# Patient Record
Sex: MALE | Race: OTHER | HISPANIC OR LATINO | URBAN - METROPOLITAN AREA
[De-identification: names, ages, dates, MRNs, and addresses within clinical notes are randomized per-mention and may not be internally consistent; named-entity substitution may affect disease eponyms.]

---

## 2017-02-21 ENCOUNTER — EMERGENCY (EMERGENCY)
Facility: HOSPITAL | Age: 47
LOS: 1 days | Discharge: HOME | End: 2017-02-21
Attending: EMERGENCY MEDICINE
Payer: COMMERCIAL

## 2017-02-21 VITALS
WEIGHT: 240.08 LBS | HEIGHT: 69 IN | RESPIRATION RATE: 20 BRPM | DIASTOLIC BLOOD PRESSURE: 97 MMHG | SYSTOLIC BLOOD PRESSURE: 145 MMHG | TEMPERATURE: 98 F | HEART RATE: 74 BPM | OXYGEN SATURATION: 96 %

## 2017-02-21 DIAGNOSIS — H57.13 OCULAR PAIN, BILATERAL: ICD-10-CM

## 2017-02-21 DIAGNOSIS — H16.133 PHOTOKERATITIS, BILATERAL: ICD-10-CM

## 2017-02-21 PROCEDURE — 99053 MED SERV 10PM-8AM 24 HR FAC: CPT

## 2017-02-21 PROCEDURE — 99283 EMERGENCY DEPT VISIT LOW MDM: CPT

## 2017-02-21 PROCEDURE — 99283 EMERGENCY DEPT VISIT LOW MDM: CPT | Mod: 25

## 2017-02-21 RX ORDER — ERYTHROMYCIN BASE 5 MG/GRAM
1 OINTMENT (GRAM) OPHTHALMIC (EYE) ONCE
Qty: 0 | Refills: 0 | Status: COMPLETED | OUTPATIENT
Start: 2017-02-21 | End: 2017-02-21

## 2017-02-21 RX ORDER — ERYTHROMYCIN BASE 5 MG/GRAM
1 OINTMENT (GRAM) OPHTHALMIC (EYE)
Qty: 1 | Refills: 0
Start: 2017-02-21 | End: 2017-03-03

## 2017-02-21 RX ADMIN — Medication 1 APPLICATION(S): at 03:42
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
## 2017-02-21 NOTE — ED PROVIDER NOTE - PROGRESS NOTE DETAILS
tetracaine and woods lamp reveal b/l cornea mildly hazy. Instillation of fluorescein reveals superficial punctuate staining of the cornea

## 2017-06-26 ENCOUNTER — APPOINTMENT (OUTPATIENT)
Dept: FAMILY MEDICINE | Facility: CLINIC | Age: 47
End: 2017-06-26

## 2017-06-26 ENCOUNTER — NON-APPOINTMENT (OUTPATIENT)
Age: 47
End: 2017-06-26

## 2017-06-26 VITALS
BODY MASS INDEX: 38.04 KG/M2 | HEIGHT: 68 IN | WEIGHT: 251 LBS | DIASTOLIC BLOOD PRESSURE: 80 MMHG | SYSTOLIC BLOOD PRESSURE: 126 MMHG

## 2017-06-26 DIAGNOSIS — Z83.3 FAMILY HISTORY OF DIABETES MELLITUS: ICD-10-CM

## 2017-06-26 DIAGNOSIS — I51.7 CARDIOMEGALY: ICD-10-CM

## 2017-06-26 DIAGNOSIS — Z72.89 OTHER PROBLEMS RELATED TO LIFESTYLE: ICD-10-CM

## 2017-06-26 DIAGNOSIS — Z82.49 FAMILY HISTORY OF ISCHEMIC HEART DISEASE AND OTHER DISEASES OF THE CIRCULATORY SYSTEM: ICD-10-CM

## 2017-06-26 DIAGNOSIS — Z00.00 ENCOUNTER FOR GENERAL ADULT MEDICAL EXAMINATION W/OUT ABNORMAL FINDINGS: ICD-10-CM

## 2017-06-26 DIAGNOSIS — R00.2 PALPITATIONS: ICD-10-CM

## 2017-06-26 DIAGNOSIS — E55.9 VITAMIN D DEFICIENCY, UNSPECIFIED: ICD-10-CM

## 2017-06-26 DIAGNOSIS — Z83.2 FAMILY HISTORY OF DISEASES OF THE BLOOD AND BLOOD-FORMING ORGANS AND CERTAIN DISORDERS INVOLVING THE IMMUNE MECHANISM: ICD-10-CM

## 2017-06-26 LAB
BILIRUB UR QL STRIP: NORMAL
GLUCOSE UR-MCNC: NORMAL
HCG UR QL: NORMAL EU/DL
HGB UR QL STRIP.AUTO: NORMAL
KETONES UR-MCNC: NORMAL
LEUKOCYTE ESTERASE UR QL STRIP: NORMAL
NITRITE UR QL STRIP: NORMAL
PH UR STRIP: 7
PROT UR STRIP-MCNC: NORMAL
SP GR UR STRIP: 1.01

## 2017-06-28 DIAGNOSIS — F10.10 ALCOHOL ABUSE, UNCOMPLICATED: ICD-10-CM

## 2017-06-28 DIAGNOSIS — E66.9 OBESITY, UNSPECIFIED: ICD-10-CM

## 2017-07-12 ENCOUNTER — APPOINTMENT (OUTPATIENT)
Dept: FAMILY MEDICINE | Facility: CLINIC | Age: 47
End: 2017-07-12

## 2017-09-08 NOTE — ED ADULT TRIAGE NOTE - HEIGHT IN INCHES
----- Message from Miriam Guevara sent at 9/8/2017  2:11 PM CDT -----  Contact: Self/ [phone]/ [phone]   Type: Orders Request    What orders/ testing are being requested? Mammo     Is there a future appointment scheduled for the patient with PCP? No     When?    Comments: pt is calling to have a order for a Mammo. Please call and advise     Thank you      9

## 2017-09-26 ENCOUNTER — APPOINTMENT (OUTPATIENT)
Dept: FAMILY MEDICINE | Facility: CLINIC | Age: 47
End: 2017-09-26
Payer: COMMERCIAL

## 2017-09-26 VITALS
DIASTOLIC BLOOD PRESSURE: 82 MMHG | BODY MASS INDEX: 35.66 KG/M2 | HEART RATE: 74 BPM | WEIGHT: 234.5 LBS | OXYGEN SATURATION: 98 % | SYSTOLIC BLOOD PRESSURE: 126 MMHG

## 2017-09-26 DIAGNOSIS — N48.9 DISORDER OF PENIS, UNSPECIFIED: ICD-10-CM

## 2017-09-26 DIAGNOSIS — Z23 ENCOUNTER FOR IMMUNIZATION: ICD-10-CM

## 2017-09-26 DIAGNOSIS — Z86.19 PERSONAL HISTORY OF OTHER INFECTIOUS AND PARASITIC DISEASES: ICD-10-CM

## 2017-09-26 DIAGNOSIS — R39.9 UNSPECIFIED SYMPTOMS AND SIGNS INVOLVING THE GENITOURINARY SYSTEM: ICD-10-CM

## 2017-09-26 PROCEDURE — 90686 IIV4 VACC NO PRSV 0.5 ML IM: CPT

## 2017-09-26 PROCEDURE — G0008: CPT

## 2017-09-26 PROCEDURE — 99213 OFFICE O/P EST LOW 20 MIN: CPT | Mod: 25

## 2017-11-20 ENCOUNTER — APPOINTMENT (OUTPATIENT)
Dept: FAMILY MEDICINE | Facility: CLINIC | Age: 47
End: 2017-11-20
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
## 2019-11-12 ENCOUNTER — TRANSCRIPTION ENCOUNTER (OUTPATIENT)
Age: 49
End: 2019-11-12

## 2020-04-17 ENCOUNTER — EMERGENCY (EMERGENCY)
Facility: HOSPITAL | Age: 50
LOS: 0 days | Discharge: HOME | End: 2020-04-17
Attending: STUDENT IN AN ORGANIZED HEALTH CARE EDUCATION/TRAINING PROGRAM
Payer: COMMERCIAL

## 2020-04-17 VITALS — WEIGHT: 190.04 LBS

## 2020-04-17 VITALS
RESPIRATION RATE: 18 BRPM | DIASTOLIC BLOOD PRESSURE: 77 MMHG | TEMPERATURE: 98 F | OXYGEN SATURATION: 96 % | SYSTOLIC BLOOD PRESSURE: 130 MMHG | HEART RATE: 100 BPM

## 2020-04-17 DIAGNOSIS — R06.02 SHORTNESS OF BREATH: ICD-10-CM

## 2020-04-17 DIAGNOSIS — R50.9 FEVER, UNSPECIFIED: ICD-10-CM

## 2020-04-17 DIAGNOSIS — J12.89 OTHER VIRAL PNEUMONIA: ICD-10-CM

## 2020-04-17 DIAGNOSIS — R11.2 NAUSEA WITH VOMITING, UNSPECIFIED: ICD-10-CM

## 2020-04-17 DIAGNOSIS — R10.13 EPIGASTRIC PAIN: ICD-10-CM

## 2020-04-17 DIAGNOSIS — U07.1 COVID-19: ICD-10-CM

## 2020-04-17 LAB
ALBUMIN SERPL ELPH-MCNC: 3 G/DL — LOW (ref 3.3–5)
ALP SERPL-CCNC: 44 U/L — SIGNIFICANT CHANGE UP (ref 40–120)
ALT FLD-CCNC: 56 U/L — SIGNIFICANT CHANGE UP (ref 12–78)
ANION GAP SERPL CALC-SCNC: 6 MMOL/L — SIGNIFICANT CHANGE UP (ref 5–17)
APAP SERPL-MCNC: 4 UG/ML — LOW (ref 10–30)
AST SERPL-CCNC: 51 U/L — HIGH (ref 15–37)
BASOPHILS # BLD AUTO: 0.01 K/UL — SIGNIFICANT CHANGE UP (ref 0–0.2)
BASOPHILS NFR BLD AUTO: 0.1 % — SIGNIFICANT CHANGE UP (ref 0–2)
BILIRUB SERPL-MCNC: 0.4 MG/DL — SIGNIFICANT CHANGE UP (ref 0.2–1.2)
BUN SERPL-MCNC: 9 MG/DL — SIGNIFICANT CHANGE UP (ref 7–23)
CALCIUM SERPL-MCNC: 8.1 MG/DL — LOW (ref 8.5–10.1)
CHLORIDE SERPL-SCNC: 103 MMOL/L — SIGNIFICANT CHANGE UP (ref 96–108)
CO2 SERPL-SCNC: 23 MMOL/L — SIGNIFICANT CHANGE UP (ref 22–31)
CREAT SERPL-MCNC: 0.86 MG/DL — SIGNIFICANT CHANGE UP (ref 0.5–1.3)
D DIMER BLD IA.RAPID-MCNC: 264 NG/ML DDU — HIGH
EOSINOPHIL # BLD AUTO: 0 K/UL — SIGNIFICANT CHANGE UP (ref 0–0.5)
EOSINOPHIL NFR BLD AUTO: 0 % — SIGNIFICANT CHANGE UP (ref 0–6)
ETHANOL SERPL-MCNC: <10 MG/DL — SIGNIFICANT CHANGE UP (ref 0–10)
GLUCOSE SERPL-MCNC: 133 MG/DL — HIGH (ref 70–99)
HCT VFR BLD CALC: 37.6 % — LOW (ref 39–50)
HGB BLD-MCNC: 13.4 G/DL — SIGNIFICANT CHANGE UP (ref 13–17)
IMM GRANULOCYTES NFR BLD AUTO: 0.5 % — SIGNIFICANT CHANGE UP (ref 0–1.5)
LYMPHOCYTES # BLD AUTO: 1.27 K/UL — SIGNIFICANT CHANGE UP (ref 1–3.3)
LYMPHOCYTES # BLD AUTO: 9.8 % — LOW (ref 13–44)
MCHC RBC-ENTMCNC: 29.6 PG — SIGNIFICANT CHANGE UP (ref 27–34)
MCHC RBC-ENTMCNC: 35.6 GM/DL — SIGNIFICANT CHANGE UP (ref 32–36)
MCV RBC AUTO: 83.2 FL — SIGNIFICANT CHANGE UP (ref 80–100)
MONOCYTES # BLD AUTO: 0.33 K/UL — SIGNIFICANT CHANGE UP (ref 0–0.9)
MONOCYTES NFR BLD AUTO: 2.5 % — SIGNIFICANT CHANGE UP (ref 2–14)
NEUTROPHILS # BLD AUTO: 11.28 K/UL — HIGH (ref 1.8–7.4)
NEUTROPHILS NFR BLD AUTO: 87.1 % — HIGH (ref 43–77)
PLATELET # BLD AUTO: 459 K/UL — HIGH (ref 150–400)
POTASSIUM SERPL-MCNC: 4.4 MMOL/L — SIGNIFICANT CHANGE UP (ref 3.5–5.3)
POTASSIUM SERPL-SCNC: 4.4 MMOL/L — SIGNIFICANT CHANGE UP (ref 3.5–5.3)
PROT SERPL-MCNC: 7.8 GM/DL — SIGNIFICANT CHANGE UP (ref 6–8.3)
RBC # BLD: 4.52 M/UL — SIGNIFICANT CHANGE UP (ref 4.2–5.8)
RBC # FLD: 12.2 % — SIGNIFICANT CHANGE UP (ref 10.3–14.5)
SARS-COV-2 RNA SPEC QL NAA+PROBE: DETECTED
SODIUM SERPL-SCNC: 132 MMOL/L — LOW (ref 135–145)
WBC # BLD: 12.95 K/UL — HIGH (ref 3.8–10.5)
WBC # FLD AUTO: 12.95 K/UL — HIGH (ref 3.8–10.5)

## 2020-04-17 PROCEDURE — 82728 ASSAY OF FERRITIN: CPT

## 2020-04-17 PROCEDURE — 85025 COMPLETE CBC W/AUTO DIFF WBC: CPT

## 2020-04-17 PROCEDURE — G0480: CPT

## 2020-04-17 PROCEDURE — 96375 TX/PRO/DX INJ NEW DRUG ADDON: CPT

## 2020-04-17 PROCEDURE — 71045 X-RAY EXAM CHEST 1 VIEW: CPT

## 2020-04-17 PROCEDURE — 86140 C-REACTIVE PROTEIN: CPT

## 2020-04-17 PROCEDURE — 87635 SARS-COV-2 COVID-19 AMP PRB: CPT

## 2020-04-17 PROCEDURE — 96374 THER/PROPH/DIAG INJ IV PUSH: CPT

## 2020-04-17 PROCEDURE — 80307 DRUG TEST PRSMV CHEM ANLYZR: CPT

## 2020-04-17 PROCEDURE — 84145 PROCALCITONIN (PCT): CPT

## 2020-04-17 PROCEDURE — 36415 COLL VENOUS BLD VENIPUNCTURE: CPT

## 2020-04-17 PROCEDURE — 71045 X-RAY EXAM CHEST 1 VIEW: CPT | Mod: 26

## 2020-04-17 PROCEDURE — 99284 EMERGENCY DEPT VISIT MOD MDM: CPT

## 2020-04-17 PROCEDURE — 99284 EMERGENCY DEPT VISIT MOD MDM: CPT | Mod: 25

## 2020-04-17 PROCEDURE — 80053 COMPREHEN METABOLIC PANEL: CPT

## 2020-04-17 PROCEDURE — 85379 FIBRIN DEGRADATION QUANT: CPT

## 2020-04-17 RX ORDER — ONDANSETRON 8 MG/1
4 TABLET, FILM COATED ORAL ONCE
Refills: 0 | Status: COMPLETED | OUTPATIENT
Start: 2020-04-17 | End: 2020-04-17

## 2020-04-17 RX ORDER — AZITHROMYCIN 500 MG/1
1 TABLET, FILM COATED ORAL
Qty: 4 | Refills: 0
Start: 2020-04-17 | End: 2020-04-20

## 2020-04-17 RX ORDER — SODIUM CHLORIDE 9 MG/ML
500 INJECTION INTRAMUSCULAR; INTRAVENOUS; SUBCUTANEOUS ONCE
Refills: 0 | Status: COMPLETED | OUTPATIENT
Start: 2020-04-17 | End: 2020-04-17

## 2020-04-17 RX ORDER — FAMOTIDINE 10 MG/ML
20 INJECTION INTRAVENOUS ONCE
Refills: 0 | Status: COMPLETED | OUTPATIENT
Start: 2020-04-17 | End: 2020-04-17

## 2020-04-17 RX ORDER — ALBUTEROL 90 UG/1
2 AEROSOL, METERED ORAL
Qty: 1 | Refills: 0
Start: 2020-04-17

## 2020-04-17 RX ORDER — AZITHROMYCIN 500 MG/1
500 TABLET, FILM COATED ORAL ONCE
Refills: 0 | Status: COMPLETED | OUTPATIENT
Start: 2020-04-17 | End: 2020-04-17

## 2020-04-17 RX ADMIN — Medication 30 MILLILITER(S): at 20:06

## 2020-04-17 RX ADMIN — AZITHROMYCIN 500 MILLIGRAM(S): 500 TABLET, FILM COATED ORAL at 21:24

## 2020-04-17 RX ADMIN — SODIUM CHLORIDE 500 MILLILITER(S): 9 INJECTION INTRAMUSCULAR; INTRAVENOUS; SUBCUTANEOUS at 20:06

## 2020-04-17 RX ADMIN — FAMOTIDINE 20 MILLIGRAM(S): 10 INJECTION INTRAVENOUS at 20:10

## 2020-04-17 RX ADMIN — Medication 100 MILLIGRAM(S): at 20:08

## 2020-04-17 RX ADMIN — ONDANSETRON 4 MILLIGRAM(S): 8 TABLET, FILM COATED ORAL at 20:08

## 2020-04-17 NOTE — ED STATDOCS - CLINICAL SUMMARY MEDICAL DECISION MAKING FREE TEXT BOX
pt with 4 days of sx consistent with COVID given multiple + sick contacts. given significant sx and borderline O2 sat, will check labs, treat symptomatically, check ambulatory sats. if ambulatory sat stable and sx improvement, will dc home with return precautions.

## 2020-04-17 NOTE — ED STATDOCS - PATIENT PORTAL LINK FT
You can access the FollowMyHealth Patient Portal offered by [facility] by registering at the following website: [email or web address]. By joining [facility]’s FollowMyHealth portal, you will also be able to view your health information using other applications (apps) compatible with our system.

## 2020-04-17 NOTE — ED ADULT NURSE NOTE - OBJECTIVE STATEMENT
pt presents to the ED c/o 4 days of fever, cough, diarrhea, nausea, epigastric pain. sx worse at night. Daughter and coworkers are COVID+. Pt has been taking Tylenol around the clock without improvement in sx so comes to ED for further evaluation. Pt AOx4, breathing symmetrical and unlabored, #20 IV inserted into L. top of hand, bloods drawn and sent to lab, pt in no acute distress at this time, will continue to monitor.

## 2020-04-17 NOTE — ED STATDOCS - NSFOLLOWUPINSTRUCTIONS_ED_ALL_ED_FT
¿Qué es el COVID-19?  La enfermedad por coronavirus 2019, o "COVID-19", es kimmy infección ocasionada por un virus específico llamado SARS-CoV-2. El virus apareció por primera vez a fines de 2019 en la ciudad de [facility], en [place], johanna desde entonces se ha propagado velozmente y ahora hay casos en muchos otros lugares, nazia [place] y [place].    Las personas que padecen COVID-19 pueden tener fiebre, tos y dificultad para respirar. Los problemas para respirar aparecen cuando la infección afecta a los pulmones y causa neumonía (figura 1).    Los expertos están estudiando mary virus y seguirán descubriendo información a medida que pase el tiempo.    ¿Cómo se contagia el COVID-19?  El COVID-19 se contagia principalmente de kimmy persona a otra, de manera similar a la gripe. En general, esto ocurre cuando kimmy persona enferma tose o estornuda cerca de otras personas. Los médicos también creen que es posible contagiarse al tocar kimmy superficie en la que se encuentra el virus y luego tocarse la boca, la nariz o los ojos.    Según los conocimientos de los expertos hasta el momento, al parecer el COVID-19 se contagia más fácilmente cuando hay síntomas presentes. También se puede contagiar sin síntomas, johanna los expertos no saben qué finley común es esto.    ¿Cuáles son los síntomas del COVID-19?  Los síntomas suelen comenzar algunos días después de que la persona se infecta con el virus, johanna en algunos casos pueden tardar aún más en aparecer.    Estos pueden ser algunos de los síntomas:    ?Fiebre    ?Tos    ?Sensación de cansancio    ?Dificultad para respirar    ?Elham musculares    La mayoría de las personas tienen síntomas leves, mientras que algunas personas no tienen ningún síntoma. Sin embargo, en otras personas el COVID-19 puede causar problemas graves nazia neumonía, falta de oxígeno o incluso la muerte. [place] es más común en personas de edad avanzada o que tienen otros problemas de krystle.    Si phu los niños pueden contraer COVID-19, al parecer es menos probable que tengan síntomas graves.    ¿Ameena consultar a un médico o enfermero?  Si tiene fiebre, tos o dificultad para respirar, y es posible que haya estado expuesto al COVID-19, llame al médico o enfermero. Podría haberse expuesto si ocurrió cualquiera de las siguientes situaciones en los últimos 14 días:    ?Tuvo contacto directo con kimmy persona que tiene el virus – Por lo general, esto significa estar a kimmy distancia de aproximadamente 6 pies (1,8 metros) de la persona afectada.    ?Vivió en kimmy milena donde hay muchas personas que tienen el virus, o viajó a alguna de esas zonas – Los Centros para el Control y la Prevención de Enfermedades (CDC) de [place] tienen información sobre las zonas afectadas, la cual se puede consultar en holley sitio web: www.cdc.gov/coronavirus/2019-ncov/travelers/index.html.    ?Asistió a un evento o lugar donde hubo casos confirmados de COVID-19 – Por ejemplo, si muchas personas se enfermaron después de asistir a determinada reunión o en holley lugar de trabajo, es posible que usted se haya expuesto.    Si sarah síntomas no son graves, es mejor que llame a holley médico, enfermero o clínica antes de ir allí. Ellos le dirán qué hacer y si deben atenderlo en persona. Si necesita ir a la clínica u hospital, tendrá que ponerse kimmy máscara. Además, es posible que el personal sanitario le pida que espere en un sitio apartado de otras personas.    Incluso si está gravemente enfermo y tiene que ir a la clínica u [facility], es conveniente que llame antes. De lion modo, el personal sanitario podrá atenderlo y también chester las medidas necesarias para proteger a los demás.    Holley médico o enfermero le hará un examen y le preguntará sobre sarah síntomas. También le hará preguntas sobre los viajes que haya hecho recientemente y si ha estado cerca de alguna persona que podría estar enferma.    ¿Es necesario que me realice pruebas?  Si el médico o enfermero sospecha que usted tiene COVID-19, tomará kimmy muestra de fluido del interior de holley nariz y posiblemente de holley boca, y la enviará a un laboratorio para que le marcus pruebas. Si tiene tos con moco, también podrían hacerse pruebas con kimmy muestra del moco. Estas pruebas permiten determinar si tiene COVID-19 u otra infección.    Además, posiblemente el médico le pida kimmy radiografía de tórax o kimmy tomografía (TAC) para revisar sarah pulmones.    ¿Cómo se trata el COVID-19?  No existe un tratamiento específico para el COVID-19. Muchas personas podrán recuperarse en holley casa, johanna aquellas que tengan síntomas graves u otros problemas de krystle posiblemente tengan que ir al hospital:    ?Enfermedad leve – La mayoría de las personas que tienen el COVID-19 solo se enferman levemente y pueden hacer reposo en holley casa hasta mejorarse. Las personas que tienen síntomas leves parecen mejorar en aproximadamente 2 semanas, johanna cada tomas es distinto.    Si se está recuperando del COVID-19, es importante que se quede en holley casa hasta que holley médico o enfermero le diga que es seguro que retome sarah actividades normales. La decisión dependerá de cuándo haya comenzado con los síntomas y, en algunos casos, de si se hizo la prueba y el resultado fue negativo (lo que indica que ya no tiene el virus en el cuerpo).    ?Enfermedad grave – Si se enferma más gravemente, es posible que deba recibir tratamiento en el hospital, quizás en la unidad de cuidados intensivos. Mientras esté allí, lo más probable es que permanezca en kimmy habitación de "aislamiento" especial. Solo el personal médico tendrá acceso a la habitación, y deberá usar batas, guantes, máscaras y protección ocular especiales.    Los médicos y enfermeros pueden supervisar y complementar la respiración y otras funciones del cuerpo, y darle la mayor comodidad posible. Deny vez necesite más oxígeno para que lo ayude a respirar fácilmente. Si tiene mucha dificultad para respirar, quizás sea necesario que lo conecten a un respirador, que es kimmy máquina para ayudarlo a respirar.    Los médicos están estudiando varias medicinas para determinar si podrían servir para tratar el COVID-19. En ciertos casos, los médicos podrían recomendar esas medicinas.    ¿Se puede prevenir el COVID-19?  Hay ciertas cosas que puede hacer para disminuir sarah posibilidades de contagiarse el COVID-19. Estas medidas son recomendables para todos, en especial ahora que la infección se está propagando muy rápidamente, johanna son aun más importantes para las personas mayores de 65 años o con otros problemas de krystle. Para ayudar a desacelerar la propagación de la infección:    ?Lávese las austen con agua y jabón frecuentemente. [place] es particularmente importante después de estar en público y tocar a otras personas o superficies. Asegúrese de refregarse las austen con jabón danilo un mínimo de 20 segundos, y de limpiarse las muñecas, las uñas y la piel entre los dedos. Luego, enjuáguese las austen y séquelas con kimmy toalla de papel que pueda tirar a la basura.    Si no tiene un lavabo cerca, puede limpiarse con un gel para austen. Los más efectivos son aquellos que contienen un mínimo de 60 % de alcohol, johanna lo mejor es lavarse las austen con jabón y agua, si es posible.    ?Evite tocarse la arnaldo con las austen, especialmente la boca, la nariz o los ojos.    ?Intente mantenerse lejos de personas que presenten cualquiera de los síntomas de la infección.    ?Evite las multitudes. Si vive en kimmy milena donde ha habido casos de COVID-19, trate de quedarse en casa el mayor tiempo posible.    Incluso si no está enfermo, limitar el contacto con otras personas puede ayudar a desacelerar la propagación de la enfermedad. Los expertos lo llaman "distanciamiento social". En general, se recomienda cancelar o postergar las grandes reuniones de personas, nazia los eventos deportivos, los conciertos, los festivales, los desfiles y las bodas. Sin embargo, las reuniones menos concurridas también pueden ser peligrosas. Si necesita estar cerca de otras personas, asegúrese de lavarse las austen con frecuencia y de evitar el contacto cuando pueda. Por ejemplo, puede evitar los apretones de mano y los choques de saad, y animar a otros a que marcus lo mismo.    ?Algunos expertos recomiendan no viajar a determinados países donde hay muchos casos de COVID-19. Las recomendaciones en cuanto a los viajes cambian con frecuencia.    Los expertos no recomiendan que use kimmy máscara si no está enfermo, nimco que tenga a holley cuidado a kimmy persona que tiene el COVID-19 (o podría tenerlo).    Si vive con alguien que tiene el COVID-19, hay algunas cosas más que puede hacer para protegerse a sí mismo y a los demás:    ?Mantenga al enfermo alejado de otras personas – El enfermo debe tener holley propia habitación y holley propio baño, si es posible. También debe comer en holley propia habitación.    ?Use máscaras – El enfermo debe usar kimmy máscara cuando esté en la misma habitación que otras personas. Si usted cuida del enfermo, también puede usar kimmy máscara para protegerse cuando estén en la misma habitación. [place] es particularmente importante si la persona enferma no puede usar kimmy máscara.    ?Lávese las austen – Lávese las austen con agua y jabón frecuentemente (helena arriba).    ?Limpie con frecuencia – Estas son algunas cosas específicas que pueden ser de ayuda:    •Póngase guantes desechables para limpiar. También es conveniente que se ponga guantes para tocar la ropa sucia, los platos, los utensilios y los desechos de la persona enferma.    •Limpie con frecuencia los objetos que se tocan mucho, nazia las encimeras, las mesas de noche, los picaportes, las computadoras, los teléfonos y las superficies de los erica.    •Limpie los objetos de holley casa con agua y jabón, johanna también use desinfectantes en las superficies adecuadas. Algunos productos de limpieza son efectivos para eliminar las bacterias johanna no los virus, por lo que es importante leer las etiquetas. La Agencia de Protección Ambiental (Environmental Protection Agency, EPA) de [place] tiene kimmy lista de productos aquí: www.epa.gov/pesticide-registration/list-n-disinfectants-use-against-sars-cov-2.    Todavía no existe ninguna vacuna para prevenir el COVID-19.    ¿Qué ameena hacer si hay un brote de COVID-19 en mi región?  Lo mejor que puede hacer para preservar holley krystle es lavarse las austen frecuentemente, evitar el contacto cercano con personas enfermas y quedarse en casa si está enfermo. Además, para ayudar a desacelerar la propagación de la enfermedad, es importante seguir todas las instrucciones oficiales que haya en holley región en cuanto a limitar el contacto entre personas. Incluso si no hay casos de COVID-19 donde usted vive, la situación podría cambiar más adelante.    Si hay un brote en holley milena, es probable que las escuelas o los negocios cierren temporalmente, y que muchos eventos se cancelen. Si eso ocurre, o si algún integrante de holley kota contrae COVID-19, es probable que deba quedarse en casa un tiempo. Hay cosas que puede hacer para prepararse para ying posibilidad. Por ejemplo, podría preguntar a holley empleador si puede trabajar desde holley casa o chester kimmy licencia, en tomas de ser necesario. Además, puede asegurarse de tener algún modo de contactar a sarah parientes, vecinos y otras personas de holley milena para poder intercambiar información fácilmente.    Las normas y las pautas podrían variar de kimmy milena a otra. Si los funcionarios de holley milena indican a los habitantes que se queden en holley casa o eviten reunirse con otras personas, es importante chester en perla esta indicación y seguir las instrucciones en la mayor medida posible. Incluso si no corre un riesgo alto de enfermarse gravemente de COVID-19, podría contagiar a otras personas. Mantener la distancia entre las personas es kimmy de las mejores formas de controlar la transmisión del virus.    Si usted u otros integrantes de holley kota sienten ansiedad con respecto al COVID-19, recuerde que la mayoría de la gente no se enferma gravemente ni muere a causa de la enfermedad. Si phu es útil estar preparado y hay cosas que puede hacer para disminuir holley riesgo y ayudar a desacelerar la propagación del virus, trate de no entrar en pánico.    ¿Dónde puedo obtener más información?  A medida que se sepa más sobre mary virus, las recomendaciones de los expertos seguirán cambiando. Consulte a holley médico o funcionario de krystle pública para contar con la información más actualizada acerca de cómo protegerse.    También puede encontrar más información sobre el COVID-19 en los siguientes sitios web:    ?Centros para el Control y la Prevención de Enfermedades de [place] (CDC): www.cdc.gov/COVID19    ?Organización Mundial de la Krystle (OMS): www.who.int/emergencies/diseases/novel-coronavirus-2019

## 2020-04-17 NOTE — ED STATDOCS - ATTENDING CONTRIBUTION TO CARE
I, Zainab Tom MD,  performed the initial face to face bedside interview with this patient regarding history of present illness, review of symptoms and relevant past medical, social and family history.  I completed an independent physical examination.  I was the initial provider who evaluated this patient. I have signed out the follow up of any pending tests (i.e. labs, radiological studies) to the ACP.  I have communicated the patient’s plan of care and disposition with the ACP.  The history, relevant review of systems, past medical and surgical history, medical decision making, and physical examination was documented by the scribe in my presence and I attest to the accuracy of the documentation.

## 2020-04-17 NOTE — ED STATDOCS - PROGRESS NOTE DETAILS
Patient seen and evaluated.   [id number].  Reviewed results with patient.  +COVID.  +COVID on cxr.  Will also treat for possible superimposed bacterial infection secondary to leukocytosis and patients chest discomfort.  Patient ambulation spo2 is 94%.  97% at rest on room air.  Patient to be d/c with pulse oximeter.  Use of Tylenol and max daily doses reviewed. Patient counseled on use at home as well as return precautions -Sahil Tidwell PA-C

## 2020-04-17 NOTE — ED STATDOCS - OBJECTIVE STATEMENT
48 y/o male with no PMHx presents to the ED c/o 4 days of fever, cough, diarrhea, nausea, epigastric pain. sx worse at night. Daughter and coworkers are COVID+. Pt has been taking Tylenol around the clock without improvement in sx so comes to ED for further evaluation.

## 2020-04-18 LAB
CRP SERPL-MCNC: 18.22 MG/DL — HIGH (ref 0–0.4)
FERRITIN SERPL-MCNC: 1262 NG/ML — HIGH (ref 30–400)
PROCALCITONIN SERPL-MCNC: 0.19 NG/ML — HIGH (ref 0.02–0.1)

## 2020-04-21 ENCOUNTER — INPATIENT (INPATIENT)
Facility: HOSPITAL | Age: 50
LOS: 6 days | Discharge: HOME HEALTH | DRG: 720 | End: 2020-04-28
Attending: FAMILY MEDICINE | Admitting: HOSPITALIST
Payer: COMMERCIAL

## 2020-04-21 VITALS
RESPIRATION RATE: 36 BRPM | HEART RATE: 103 BPM | TEMPERATURE: 99 F | DIASTOLIC BLOOD PRESSURE: 75 MMHG | SYSTOLIC BLOOD PRESSURE: 140 MMHG | OXYGEN SATURATION: 82 % | HEIGHT: 69 IN | WEIGHT: 203.05 LBS

## 2020-04-21 DIAGNOSIS — R09.02 HYPOXEMIA: ICD-10-CM

## 2020-04-21 DIAGNOSIS — I26.99 OTHER PULMONARY EMBOLISM WITHOUT ACUTE COR PULMONALE: ICD-10-CM

## 2020-04-21 LAB
ALBUMIN SERPL ELPH-MCNC: 2.5 G/DL — LOW (ref 3.3–5)
ALBUMIN SERPL ELPH-MCNC: 2.5 G/DL — LOW (ref 3.3–5)
ALP SERPL-CCNC: 64 U/L — SIGNIFICANT CHANGE UP (ref 40–120)
ALP SERPL-CCNC: 64 U/L — SIGNIFICANT CHANGE UP (ref 40–120)
ALT FLD-CCNC: 101 U/L — HIGH (ref 12–78)
ALT FLD-CCNC: 97 U/L — HIGH (ref 12–78)
ANION GAP SERPL CALC-SCNC: 8 MMOL/L — SIGNIFICANT CHANGE UP (ref 5–17)
ANION GAP SERPL CALC-SCNC: 8 MMOL/L — SIGNIFICANT CHANGE UP (ref 5–17)
APTT BLD: 25.2 SEC — LOW (ref 27.5–36.3)
APTT BLD: 25.3 SEC — LOW (ref 27.5–36.3)
AST SERPL-CCNC: 81 U/L — HIGH (ref 15–37)
AST SERPL-CCNC: 91 U/L — HIGH (ref 15–37)
BASE EXCESS BLDA CALC-SCNC: -0.8 MMOL/L — SIGNIFICANT CHANGE UP (ref -2–2)
BASOPHILS # BLD AUTO: 0.03 K/UL — SIGNIFICANT CHANGE UP (ref 0–0.2)
BASOPHILS NFR BLD AUTO: 0.3 % — SIGNIFICANT CHANGE UP (ref 0–2)
BILIRUB SERPL-MCNC: 0.6 MG/DL — SIGNIFICANT CHANGE UP (ref 0.2–1.2)
BILIRUB SERPL-MCNC: 0.7 MG/DL — SIGNIFICANT CHANGE UP (ref 0.2–1.2)
BLOOD GAS COMMENTS ARTERIAL: SIGNIFICANT CHANGE UP
BUN SERPL-MCNC: 6 MG/DL — LOW (ref 7–23)
BUN SERPL-MCNC: 7 MG/DL — SIGNIFICANT CHANGE UP (ref 7–23)
CALCIUM SERPL-MCNC: 7.8 MG/DL — LOW (ref 8.5–10.1)
CALCIUM SERPL-MCNC: 8 MG/DL — LOW (ref 8.5–10.1)
CHLORIDE SERPL-SCNC: 92 MMOL/L — LOW (ref 96–108)
CHLORIDE SERPL-SCNC: 99 MMOL/L — SIGNIFICANT CHANGE UP (ref 96–108)
CK SERPL-CCNC: 661 U/L — HIGH (ref 26–308)
CK SERPL-CCNC: 673 U/L — HIGH (ref 26–308)
CK SERPL-CCNC: 796 U/L — HIGH (ref 26–308)
CO2 SERPL-SCNC: 24 MMOL/L — SIGNIFICANT CHANGE UP (ref 22–31)
CO2 SERPL-SCNC: 24 MMOL/L — SIGNIFICANT CHANGE UP (ref 22–31)
CREAT SERPL-MCNC: 0.74 MG/DL — SIGNIFICANT CHANGE UP (ref 0.5–1.3)
CREAT SERPL-MCNC: 0.78 MG/DL — SIGNIFICANT CHANGE UP (ref 0.5–1.3)
CRP SERPL-MCNC: 28.04 MG/DL — HIGH (ref 0–0.4)
CRP SERPL-MCNC: 30.06 MG/DL — HIGH (ref 0–0.4)
D DIMER BLD IA.RAPID-MCNC: HIGH NG/ML DDU
EOSINOPHIL # BLD AUTO: 0.02 K/UL — SIGNIFICANT CHANGE UP (ref 0–0.5)
EOSINOPHIL NFR BLD AUTO: 0.2 % — SIGNIFICANT CHANGE UP (ref 0–6)
FERRITIN SERPL-MCNC: 1124 NG/ML — HIGH (ref 30–400)
FERRITIN SERPL-MCNC: 1179 NG/ML — HIGH (ref 30–400)
GAS PNL BLDA: SIGNIFICANT CHANGE UP
GLUCOSE SERPL-MCNC: 116 MG/DL — HIGH (ref 70–99)
GLUCOSE SERPL-MCNC: 130 MG/DL — HIGH (ref 70–99)
HCO3 BLDA-SCNC: 22 MMOL/L — SIGNIFICANT CHANGE UP (ref 21–29)
HCT VFR BLD CALC: 34.1 % — LOW (ref 39–50)
HCT VFR BLD CALC: 34.4 % — LOW (ref 39–50)
HGB BLD-MCNC: 12.1 G/DL — LOW (ref 13–17)
HGB BLD-MCNC: 12.3 G/DL — LOW (ref 13–17)
IMM GRANULOCYTES NFR BLD AUTO: 1.8 % — HIGH (ref 0–1.5)
INR BLD: 1.28 RATIO — HIGH (ref 0.88–1.16)
INR BLD: 1.33 RATIO — HIGH (ref 0.88–1.16)
LACTATE SERPL-SCNC: 1.3 MMOL/L — SIGNIFICANT CHANGE UP (ref 0.7–2)
LDH SERPL L TO P-CCNC: 841 U/L — HIGH (ref 84–241)
LYMPHOCYTES # BLD AUTO: 0.66 K/UL — LOW (ref 1–3.3)
LYMPHOCYTES # BLD AUTO: 6.1 % — LOW (ref 13–44)
MAGNESIUM SERPL-MCNC: 2.3 MG/DL — SIGNIFICANT CHANGE UP (ref 1.6–2.6)
MCHC RBC-ENTMCNC: 29.3 PG — SIGNIFICANT CHANGE UP (ref 27–34)
MCHC RBC-ENTMCNC: 29.6 PG — SIGNIFICANT CHANGE UP (ref 27–34)
MCHC RBC-ENTMCNC: 35.2 GM/DL — SIGNIFICANT CHANGE UP (ref 32–36)
MCHC RBC-ENTMCNC: 36.1 GM/DL — HIGH (ref 32–36)
MCV RBC AUTO: 82.2 FL — SIGNIFICANT CHANGE UP (ref 80–100)
MCV RBC AUTO: 83.3 FL — SIGNIFICANT CHANGE UP (ref 80–100)
MONOCYTES # BLD AUTO: 0.4 K/UL — SIGNIFICANT CHANGE UP (ref 0–0.9)
MONOCYTES NFR BLD AUTO: 3.7 % — SIGNIFICANT CHANGE UP (ref 2–14)
NEUTROPHILS # BLD AUTO: 9.52 K/UL — HIGH (ref 1.8–7.4)
NEUTROPHILS NFR BLD AUTO: 87.9 % — HIGH (ref 43–77)
PCO2 BLDA: 30 MMHG — LOW (ref 32–46)
PH BLDA: 7.48 — HIGH (ref 7.35–7.45)
PHOSPHATE SERPL-MCNC: 2.6 MG/DL — SIGNIFICANT CHANGE UP (ref 2.5–4.5)
PLATELET # BLD AUTO: 232 K/UL — SIGNIFICANT CHANGE UP (ref 150–400)
PLATELET # BLD AUTO: 234 K/UL — SIGNIFICANT CHANGE UP (ref 150–400)
PO2 BLDA: 161 MMHG — HIGH (ref 74–108)
POTASSIUM SERPL-MCNC: 3.9 MMOL/L — SIGNIFICANT CHANGE UP (ref 3.5–5.3)
POTASSIUM SERPL-MCNC: 3.9 MMOL/L — SIGNIFICANT CHANGE UP (ref 3.5–5.3)
POTASSIUM SERPL-SCNC: 3.9 MMOL/L — SIGNIFICANT CHANGE UP (ref 3.5–5.3)
POTASSIUM SERPL-SCNC: 3.9 MMOL/L — SIGNIFICANT CHANGE UP (ref 3.5–5.3)
PROCALCITONIN SERPL-MCNC: 0.15 NG/ML — HIGH (ref 0.02–0.1)
PROCALCITONIN SERPL-MCNC: 0.18 NG/ML — HIGH (ref 0.02–0.1)
PROT SERPL-MCNC: 7 GM/DL — SIGNIFICANT CHANGE UP (ref 6–8.3)
PROT SERPL-MCNC: 7.4 GM/DL — SIGNIFICANT CHANGE UP (ref 6–8.3)
PROTHROM AB SERPL-ACNC: 14.3 SEC — HIGH (ref 10–12.9)
PROTHROM AB SERPL-ACNC: 14.9 SEC — HIGH (ref 10–12.9)
RBC # BLD: 4.13 M/UL — LOW (ref 4.2–5.8)
RBC # BLD: 4.15 M/UL — LOW (ref 4.2–5.8)
RBC # FLD: 11.8 % — SIGNIFICANT CHANGE UP (ref 10.3–14.5)
RBC # FLD: 11.9 % — SIGNIFICANT CHANGE UP (ref 10.3–14.5)
SAO2 % BLDA: 99 % — HIGH (ref 92–96)
SODIUM SERPL-SCNC: 124 MMOL/L — LOW (ref 135–145)
SODIUM SERPL-SCNC: 131 MMOL/L — LOW (ref 135–145)
TROPONIN I SERPL-MCNC: 0.05 NG/ML — HIGH (ref 0.01–0.04)
TROPONIN I SERPL-MCNC: 0.38 NG/ML — HIGH (ref 0.01–0.04)
WBC # BLD: 10.83 K/UL — HIGH (ref 3.8–10.5)
WBC # BLD: 9.64 K/UL — SIGNIFICANT CHANGE UP (ref 3.8–10.5)
WBC # FLD AUTO: 10.83 K/UL — HIGH (ref 3.8–10.5)
WBC # FLD AUTO: 9.64 K/UL — SIGNIFICANT CHANGE UP (ref 3.8–10.5)

## 2020-04-21 PROCEDURE — 85730 THROMBOPLASTIN TIME PARTIAL: CPT

## 2020-04-21 PROCEDURE — 93005 ELECTROCARDIOGRAM TRACING: CPT

## 2020-04-21 PROCEDURE — 76700 US EXAM ABDOM COMPLETE: CPT

## 2020-04-21 PROCEDURE — 80074 ACUTE HEPATITIS PANEL: CPT

## 2020-04-21 PROCEDURE — 71275 CT ANGIOGRAPHY CHEST: CPT | Mod: 26

## 2020-04-21 PROCEDURE — 93970 EXTREMITY STUDY: CPT | Mod: 26

## 2020-04-21 PROCEDURE — 84100 ASSAY OF PHOSPHORUS: CPT

## 2020-04-21 PROCEDURE — 86140 C-REACTIVE PROTEIN: CPT

## 2020-04-21 PROCEDURE — 36415 COLL VENOUS BLD VENIPUNCTURE: CPT

## 2020-04-21 PROCEDURE — 83615 LACTATE (LD) (LDH) ENZYME: CPT

## 2020-04-21 PROCEDURE — 82803 BLOOD GASES ANY COMBINATION: CPT

## 2020-04-21 PROCEDURE — 85610 PROTHROMBIN TIME: CPT

## 2020-04-21 PROCEDURE — 93970 EXTREMITY STUDY: CPT

## 2020-04-21 PROCEDURE — 93306 TTE W/DOPPLER COMPLETE: CPT | Mod: 26

## 2020-04-21 PROCEDURE — 85379 FIBRIN DEGRADATION QUANT: CPT

## 2020-04-21 PROCEDURE — 83735 ASSAY OF MAGNESIUM: CPT

## 2020-04-21 PROCEDURE — 36600 WITHDRAWAL OF ARTERIAL BLOOD: CPT

## 2020-04-21 PROCEDURE — 99497 ADVNCD CARE PLAN 30 MIN: CPT

## 2020-04-21 PROCEDURE — 99223 1ST HOSP IP/OBS HIGH 75: CPT

## 2020-04-21 PROCEDURE — 82728 ASSAY OF FERRITIN: CPT

## 2020-04-21 PROCEDURE — 82550 ASSAY OF CK (CPK): CPT

## 2020-04-21 PROCEDURE — 93306 TTE W/DOPPLER COMPLETE: CPT

## 2020-04-21 PROCEDURE — 93010 ELECTROCARDIOGRAM REPORT: CPT

## 2020-04-21 PROCEDURE — 85027 COMPLETE CBC AUTOMATED: CPT

## 2020-04-21 PROCEDURE — 71275 CT ANGIOGRAPHY CHEST: CPT

## 2020-04-21 PROCEDURE — 82962 GLUCOSE BLOOD TEST: CPT

## 2020-04-21 PROCEDURE — 84145 PROCALCITONIN (PCT): CPT

## 2020-04-21 PROCEDURE — 80053 COMPREHEN METABOLIC PANEL: CPT

## 2020-04-21 PROCEDURE — 80048 BASIC METABOLIC PNL TOTAL CA: CPT

## 2020-04-21 PROCEDURE — 84484 ASSAY OF TROPONIN QUANT: CPT

## 2020-04-21 RX ORDER — HYDROXYCHLOROQUINE SULFATE 200 MG
400 TABLET ORAL EVERY 24 HOURS
Refills: 0 | Status: COMPLETED | OUTPATIENT
Start: 2020-04-22 | End: 2020-04-25

## 2020-04-21 RX ORDER — ENOXAPARIN SODIUM 100 MG/ML
40 INJECTION SUBCUTANEOUS EVERY 12 HOURS
Refills: 0 | Status: DISCONTINUED | OUTPATIENT
Start: 2020-04-21 | End: 2020-04-21

## 2020-04-21 RX ORDER — ACETAMINOPHEN 500 MG
650 TABLET ORAL EVERY 4 HOURS
Refills: 0 | Status: DISCONTINUED | OUTPATIENT
Start: 2020-04-21 | End: 2020-04-28

## 2020-04-21 RX ORDER — CEFTRIAXONE 500 MG/1
INJECTION, POWDER, FOR SOLUTION INTRAMUSCULAR; INTRAVENOUS
Refills: 0 | Status: COMPLETED | OUTPATIENT
Start: 2020-04-21 | End: 2020-04-27

## 2020-04-21 RX ORDER — CEFTRIAXONE 500 MG/1
1000 INJECTION, POWDER, FOR SOLUTION INTRAMUSCULAR; INTRAVENOUS ONCE
Refills: 0 | Status: COMPLETED | OUTPATIENT
Start: 2020-04-21 | End: 2020-04-21

## 2020-04-21 RX ORDER — SODIUM CHLORIDE 9 MG/ML
1000 INJECTION INTRAMUSCULAR; INTRAVENOUS; SUBCUTANEOUS ONCE
Refills: 0 | Status: COMPLETED | OUTPATIENT
Start: 2020-04-21 | End: 2020-04-21

## 2020-04-21 RX ORDER — HYDROXYCHLOROQUINE SULFATE 200 MG
TABLET ORAL
Refills: 0 | Status: COMPLETED | OUTPATIENT
Start: 2020-04-21 | End: 2020-04-25

## 2020-04-21 RX ORDER — CEFTRIAXONE 500 MG/1
INJECTION, POWDER, FOR SOLUTION INTRAMUSCULAR; INTRAVENOUS
Refills: 0 | Status: DISCONTINUED | OUTPATIENT
Start: 2020-04-21 | End: 2020-04-21

## 2020-04-21 RX ORDER — CEFTRIAXONE 500 MG/1
1000 INJECTION, POWDER, FOR SOLUTION INTRAMUSCULAR; INTRAVENOUS EVERY 24 HOURS
Refills: 0 | Status: COMPLETED | OUTPATIENT
Start: 2020-04-22 | End: 2020-04-26

## 2020-04-21 RX ORDER — THIAMINE MONONITRATE (VIT B1) 100 MG
100 TABLET ORAL DAILY
Refills: 0 | Status: DISCONTINUED | OUTPATIENT
Start: 2020-04-21 | End: 2020-04-28

## 2020-04-21 RX ORDER — FOLIC ACID 0.8 MG
1 TABLET ORAL DAILY
Refills: 0 | Status: DISCONTINUED | OUTPATIENT
Start: 2020-04-21 | End: 2020-04-28

## 2020-04-21 RX ORDER — ENOXAPARIN SODIUM 100 MG/ML
100 INJECTION SUBCUTANEOUS EVERY 12 HOURS
Refills: 0 | Status: DISCONTINUED | OUTPATIENT
Start: 2020-04-21 | End: 2020-04-22

## 2020-04-21 RX ORDER — HYDROXYCHLOROQUINE SULFATE 200 MG
800 TABLET ORAL EVERY 24 HOURS
Refills: 0 | Status: COMPLETED | OUTPATIENT
Start: 2020-04-21 | End: 2020-04-21

## 2020-04-21 RX ADMIN — Medication 800 MILLIGRAM(S): at 06:28

## 2020-04-21 RX ADMIN — SODIUM CHLORIDE 1000 MILLILITER(S): 9 INJECTION INTRAMUSCULAR; INTRAVENOUS; SUBCUTANEOUS at 01:21

## 2020-04-21 RX ADMIN — Medication 100 MILLIGRAM(S): at 17:07

## 2020-04-21 RX ADMIN — Medication 1 TABLET(S): at 11:06

## 2020-04-21 RX ADMIN — Medication 40 MILLIGRAM(S): at 06:23

## 2020-04-21 RX ADMIN — Medication 40 MILLIGRAM(S): at 17:08

## 2020-04-21 RX ADMIN — ENOXAPARIN SODIUM 40 MILLIGRAM(S): 100 INJECTION SUBCUTANEOUS at 06:24

## 2020-04-21 RX ADMIN — Medication 1 MILLIGRAM(S): at 11:06

## 2020-04-21 RX ADMIN — ENOXAPARIN SODIUM 100 MILLIGRAM(S): 100 INJECTION SUBCUTANEOUS at 17:41

## 2020-04-21 RX ADMIN — Medication 1200 MILLIGRAM(S): at 17:07

## 2020-04-21 RX ADMIN — Medication 100 MILLIGRAM(S): at 11:06

## 2020-04-21 RX ADMIN — CEFTRIAXONE 1000 MILLIGRAM(S): 500 INJECTION, POWDER, FOR SOLUTION INTRAMUSCULAR; INTRAVENOUS at 11:05

## 2020-04-21 NOTE — ED ADULT NURSE NOTE - NSIMPLEMENTINTERV_GEN_ALL_ED
Implemented All Universal Safety Interventions:  [place] to call system. Call bell, personal items and telephone within reach. Instruct patient to call for assistance. Room bathroom lighting operational. Non-slip footwear when patient is off stretcher. Physically safe environment: no spills, clutter or unnecessary equipment. Stretcher in lowest position, wheels locked, appropriate side rails in place.

## 2020-04-21 NOTE — H&P ADULT - HISTORY OF PRESENT ILLNESS
48 y/o M with no significant PMH, p/w cough, runny nose and sore throat for 5 days. Patient tested COVID positive on 4/17/20. Patient states that he has been having SOB for 2-3 weeks. Also developed substernal CP 2 days ago, has some radiation to abdomen, but denies radiation to arm / jaw / back. C/o 3 episodes of vomiting. Denies diarrhea, abdominal pain. +Fever / chills.     PSH: Denies     Social Hx: Denies tobacco, etoh - 3-4 drinks per day, drugs - denies     Family Hx: Denies

## 2020-04-21 NOTE — CONSULT NOTE ADULT - SUBJECTIVE AND OBJECTIVE BOX
Patient is a 49y old  Male who presents with a chief complaint of Cough (21 Apr 2020 13:05)    HPI:  48 y/o M with no significant PMH, p/w cough, runny nose and sore throat for 5 days. Patient tested COVID positive on 4/17/20. Patient states that he has been having SOB for 2-3 weeks. Also developed substernal CP 2 days ago, has some radiation to abdomen, but denies radiation to arm / jaw / back. C/o 3 episodes of vomiting. Denies diarrhea, abdominal pain. +Fever / chills.  Here febrile 101, wbc ct 12.9, CTA no central PE, b/l infiltrates, hypoxic requiring NRB, was given IV steroids/rocephin/doxycycline/plaquenil.     PSH: Denies   PMH: as above    Meds: per reconciliation sheet, noted below  MEDICATIONS  (STANDING):  cefTRIAXone Injectable.      doxycycline hyclate Capsule 100 milliGRAM(s) Oral every 12 hours  enoxaparin Injectable 100 milliGRAM(s) SubCutaneous every 12 hours  folic acid 1 milliGRAM(s) Oral daily  guaiFENesin ER 1200 milliGRAM(s) Oral every 12 hours  hydroxychloroquine   Oral   methylPREDNISolone sodium succinate Injectable 40 milliGRAM(s) IV Push every 12 hours  multivitamin 1 Tablet(s) Oral daily  thiamine 100 milliGRAM(s) Oral daily      Allergies    No Known Allergies    Intolerances      Social: no smoking, no alcohol, no illegal drugs; no recent travel, no exposure to TB  FAMILY HISTORY:     no history of premature cardiovascular disease in first degree relatives    ROS:  no HA, no dizziness, no sore throat, no blurry vision, no CP, no palpitations, no abdominal pain, no diarrhea, no N/V, no dysuria, no leg pain, no claudication, no rash, no joint aches, no rectal pain or bleeding, no night sweats  All other systems reviewed and are negative    Vital Signs Last 24 Hrs  T(C): 37.4 (21 Apr 2020 11:20), Max: 38.4 (21 Apr 2020 04:10)  T(F): 99.4 (21 Apr 2020 11:20), Max: 101.1 (21 Apr 2020 04:10)  HR: 75 (21 Apr 2020 11:20) (75 - 103)  BP: 131/84 (21 Apr 2020 11:20) (131/84 - 153/80)  BP(mean): --  RR: 20 (21 Apr 2020 11:20) (20 - 36)  SpO2: 100% (21 Apr 2020 11:20) (82% - 100%)  Daily Height in cm: 175.26 (21 Apr 2020 00:39)    Daily     PE:  Constitutional: NAD, on NRB  HEENT: NC/AT, EOMI, PERRLA, conjunctivae clear; ears and nose atraumatic; pharynx benign  Neck: supple; thyroid not palpable  Back: no tenderness  Respiratory: decreased breath sounds  Cardiovascular: S1S2 regular, no murmurs  Abdomen: soft, not tender, not distended, positive BS; liver and spleen WNL  Genitourinary: no suprapubic tenderness  Lymphatic: no LN palpable  Musculoskeletal: no muscle tenderness, no joint swelling or tenderness  Extremities: no pedal edema  Neurological/ Psychiatric: moving all extremities  Skin: no rashes; no palpable lesions    Labs: all available labs reviewed                        12.1   9.64  )-----------( 232      ( 21 Apr 2020 07:20 )             34.4     04-21    131<L>  |  99  |  7   ----------------------------<  116<H>  3.9   |  24  |  0.74    Ca    8.0<L>      21 Apr 2020 07:20  Phos  2.6     04-21  Mg     2.3     04-21    TPro  7.0  /  Alb  2.5<L>  /  TBili  0.6  /  DBili  x   /  AST  91<H>  /  ALT  101<H>  /  AlkPhos  64  04-21     LIVER FUNCTIONS - ( 21 Apr 2020 07:20 )  Alb: 2.5 g/dL / Pro: 7.0 gm/dL / ALK PHOS: 64 U/L / ALT: 101 U/L / AST: 91 U/L / GGT: x                 Radiology: all available radiological tests reviewed    < from: US Duplex Venous Lower Ext Complete, Bilateral (04.21.20 @ 13:28) >  EXAM:  US DPLX LWR EXT VEINS COMPL BI                            PROCEDURE DATE:  04/21/2020          INTERPRETATION:  CLINICAL INFORMATION: Leg pain    COMPARISON: None available.    TECHNIQUE: Duplex sonography of the BILATERAL LOWER extremity veins with color and spectral Doppler, with and without compression.      FINDINGS:    There is normal compressibility of the bilateral common femoral, femoral and popliteal veins.     Doppler examination shows normal spontaneous and phasic flow.    Bilateral calf vein thrombosis is detected in the posterior tibial veins.    IMPRESSION:     Positive for deep venous thrombosis in bilateral lower extremities (posterior tibial veins)      Advanced directives addressed: full resuscitation

## 2020-04-21 NOTE — H&P ADULT - ASSESSMENT
50 y/o M with no significant PMH, p/w cough, runny nose and sore throat for 5 day    *Sepsis and Acute hypoxic respiratory failure 2/2 COVID 19 viral pneumonia  -Will start Plaquenil as per hospital protocol. Off label use explained to patient, including risks vs benefits and side effects. Patient is agreeable. Qtc = 450  -Patient is on NRB -> will start patient on steroids as per hospital protocol. Monitor FS  -CXR from 4/17 - B/L patchy airspace opacities   -D-dimer = 98836 -> F/u CT chest result to r/o PE   -Isolation precautions   -ID consult for possible Anakinra  -Lactate = 1.3  -Continuous pulse ox monitoring     *Mildly elevated troponins / Chest pain - R/o PE vs ACS   -Trend troponins  -Cardio consult   -Tele monitoring  -Will avoid NSAIDs at this time, as elevated troponins could be 2/2 demand ischemia 2/2 sepsis and patient is COVID positive   -Explained to patient that he will be on chemical DVT ppx, and that if PE is seen on CT he will need anti-coagulation    *Etoh Abuse  -Patient states he drinks 3-4 drinks per day  -No signs of withdrawal at this time, will monitor   -MVI / thiamine / folate  -If any signs of withdrawal appear -> will start Ativan PRN    *Advance Directives  -Discussed with patient regarding wishes for cardiac resucitation and mechanical ventilation, and patient wishes to have both if necessary, and is full code. Advance Care Planning time  <20 minutes    *DVT ppx  -Lovenox 48 y/o M with no significant PMH, p/w cough, runny nose and sore throat for 5 day    *Sepsis and Acute hypoxic respiratory failure 2/2 COVID 19 viral pneumonia  -Will start Plaquenil as per hospital protocol. Off label use explained to patient, including risks vs benefits and side effects. Patient is agreeable. Qtc = 450  -Patient is on NRB -> will start patient on steroids as per hospital protocol. Monitor FS  -CXR from 4/17 - B/L patchy airspace opacities   -D-dimer = 77073 -> F/u CT chest result to r/o PE   -Isolation precautions   -Lactate = 1.3  -Continuous pulse ox monitoring. Presently saturating at 98% at bedside on NRB     *Chest pain / Mildly elevated troponins - R/o PE vs ACS   -Trend troponins  -Cardio consult   -Tele monitoring  -Will avoid NSAIDs at this time, as elevated troponins could be 2/2 demand ischemia 2/2 sepsis and patient is COVID positive     *Etoh Abuse  -Patient states he drinks 3-4 drinks per day  -No signs of withdrawal at this time, will monitor   -MVI / thiamine / folate  -If any signs of withdrawal appear -> will start Ativan PRN    *Advance Directives  -Discussed with patient regarding wishes for cardiac resuscitation and mechanical ventilation, and patient wishes to have both if necessary, and is full code. Advance Care Planning time  <20 minutes    *DVT ppx  -Lovenox BID as per hospital protocol for COVID19 patients. Explained to patient need for prophylaxis

## 2020-04-21 NOTE — ED ADULT TRIAGE NOTE - CHIEF COMPLAINT QUOTE
short of breath + covid oxygen saturation at triage on room air 77-82% ( pt has pulse ox that we gave him). Daughters name Kiana Jones [phone] please call for information / updates

## 2020-04-21 NOTE — CONSULT NOTE ADULT - ASSESSMENT
50 y/o M with no significant PMH, p/w cough, runny nose and sore throat for 5 days. Patient tested COVID positive on 4/17/20. Patient states that he has been having SOB for 2-3 weeks. Also developed substernal CP 2 days ago, has some radiation to abdomen, but denies radiation to arm / jaw / back. C/o 3 episodes of vomiting. Denies diarrhea, abdominal pain. +Fever / chills.  Here febrile 101, wbc ct 12.9, CTA no central PE, b/l infiltrates, hypoxic requiring NRB, was given IV steroids/rocephin/doxycycline/plaquenil.     1. acute hypoxic resp failure. viral syndrome. COVID-19. pneumonia. b/l DVT/possible PE  - on plaquenil #1/5 off label use, monitor qtc closely  - on rocephin 1gm daily   - on doxycycline 100mg BID   - abx for superimposed bacterial coverage  - on IV solumedrol  - started on full dose ac for DVT/possible PE   - monitor temps  - monitor resp status, if o2 sat <92% >6 hrs, elevated inflammatory markers, will consider addition of anakinra for treatment of secondary HLH/cytokine storm d/t covid  - f/u cbc  - tolerating abx well so far; no side effects noted  - reason for abx use and side effects reviewed with patient  - supportive care    2. other issues - care per medicine

## 2020-04-21 NOTE — CONSULT NOTE ADULT - SUBJECTIVE AND OBJECTIVE BOX
Cardiology Consultation    HPI: 50 y/o M with no significant PMH, p/w cough, runny nose and sore throat for 5 days. Patient tested COVID positive on 4/17/20. Patient states that he has been having SOB for 2-3 weeks. Also developed substernal CP 2 days ago, has some radiation to abdomen, but denies radiation to arm / jaw / back. C/o 3 episodes of vomiting. Denies diarrhea, abdominal pain. +Fever / chills.     4/21. Lethargic, ill appearing. No CP at present. +SOB, cough.    PSH: Denies     Social Hx: Denies tobacco, etoh - 3-4 drinks per day, drugs - denies     Family Hx: Denies (21 Apr 2020 03:29)    PAST MEDICAL & SURGICAL HISTORY:  No pertinent past medical history  No significant past surgical history    Allergies  No Known Allergies    MEDICATIONS  (STANDING):  cefTRIAXone Injectable.      doxycycline hyclate Capsule 100 milliGRAM(s) Oral every 12 hours  enoxaparin Injectable 100 milliGRAM(s) SubCutaneous every 12 hours  folic acid 1 milliGRAM(s) Oral daily  guaiFENesin ER 1200 milliGRAM(s) Oral every 12 hours  hydroxychloroquine   Oral   methylPREDNISolone sodium succinate Injectable 40 milliGRAM(s) IV Push every 12 hours  multivitamin 1 Tablet(s) Oral daily  thiamine 100 milliGRAM(s) Oral daily    MEDICATIONS  (PRN):  acetaminophen   Tablet .. 650 milliGRAM(s) Oral every 4 hours PRN Temp greater or equal to 38.5C (101.3F)    Vital Signs Last 24 Hrs  T(C): 37.4 (21 Apr 2020 11:20), Max: 38.4 (21 Apr 2020 04:10)  T(F): 99.4 (21 Apr 2020 11:20), Max: 101.1 (21 Apr 2020 04:10)  HR: 75 (21 Apr 2020 11:20) (75 - 103)  BP: 131/84 (21 Apr 2020 11:20) (131/84 - 153/80)  BP(mean): --  RR: 20 (21 Apr 2020 11:20) (20 - 36)  SpO2: 100% (21 Apr 2020 11:20) (82% - 100%)    REVIEW OF SYSTEMS:    CONSTITUTIONAL:  As per HPI.  HEENT:  Eyes:  No diplopia or blurred vision. ENT:  No earache, sore throat or runny nose.  CARDIOVASCULAR:  No pressure, squeezing, strangling, tightness, heaviness or aching about the chest, neck, axilla or epigastrium.  RESPIRATORY:  No cough, shortness of breath, PND or orthopnea.  GASTROINTESTINAL:  No nausea, vomiting or diarrhea.  GENITOURINARY:  No dysuria, frequency or urgency.  MUSCULOSKELETAL:  As per HPI.  NEUROLOGIC:  No paresthesias, fasciculations, seizures or weakness.    PHYSICAL EXAMINATION:    GENERAL APPEARANCE:  Pt. is not currently dyspneic, in no distress. Pt. is alert, oriented, and pleasant.  HEENT:  Pupils are normal and react normally. No icterus. Mucous membranes well colored.  NECK:  Supple. No lymphadenopathy. Jugular venous pressure not elevated. Carotids equal.   HEART:   The cardiac impulse has a normal quality. There are no murmurs, rubs or gallops noted  CHEST:  Chest is clear to auscultation. Normal respiratory effort.  ABDOMEN:  Soft and nontender.   EXTREMITIES:  There is no edema.     I&O's Summary    21 Apr 2020 07:01  -  21 Apr 2020 15:49  --------------------------------------------------------  IN: 0 mL / OUT: 1000 mL / NET: -1000 mL    LABS:                        12.1   9.64  )-----------( 232      ( 21 Apr 2020 07:20 )             34.4     04-21    131<L>  |  99  |  7   ----------------------------<  116<H>  3.9   |  24  |  0.74    Ca    8.0<L>      21 Apr 2020 07:20  Phos  2.6     04-21  Mg     2.3     04-21    TPro  7.0  /  Alb  2.5<L>  /  TBili  0.6  /  DBili  x   /  AST  91<H>  /  ALT  101<H>  /  AlkPhos  64  04-21    LIVER FUNCTIONS - ( 21 Apr 2020 07:20 )  Alb: 2.5 g/dL / Pro: 7.0 gm/dL / ALK PHOS: 64 U/L / ALT: 101 U/L / AST: 91 U/L / GGT: x           PT/INR - ( 21 Apr 2020 07:20 )   PT: 14.3 sec;   INR: 1.28 ratio       PTT - ( 21 Apr 2020 07:20 )  PTT:25.3 sec  CARDIAC MARKERS ( 21 Apr 2020 07:20 )  0.083 ng/mL / x     / 796 U/L / x     / x      CARDIAC MARKERS ( 21 Apr 2020 00:43 )  0.047 ng/mL / x     / 673 U/L / x     / x        EKG: SR @ 88. Nl axis, intervals. No dynamic ST changes.     TELEMETRY: SR    CARDIAC TESTS: pending    RADIOLOGY & ADDITIONAL STUDIES: < from: CT Angio Chest PE Protocol w/ IV Cont (04.21.20 @ 02:20) >  Suboptimal pulmonary arterial opacification. No central pulmonary embolism. Segmental and subsegmental branches cannot be assessed.  Diffuse bilateral airspace and groundglass opacity suggesting atypical infection including COVID-19.    < from: US Duplex Venous Lower Ext Complete, Bilateral (04.21.20 @ 13:28) >  Positive for deep venous thrombosis in bilateral lower extremities (posterior tibial veins).    ASSESSMENT & PLAN:

## 2020-04-21 NOTE — ED PROVIDER NOTE - OBJECTIVE STATEMENT
48 y/o male in ED c/o worsening sob today.   pt states fever, cough, congestion, sob, myalgia x 1 wk.   pt states seen in ED on 4/17/2020 and found with positive COVID.    positive sick contacts.   tolerating PO.   states d/c on 4/17 with pulse ox and today was 70%

## 2020-04-21 NOTE — CONSULT NOTE ADULT - SUBJECTIVE AND OBJECTIVE BOX
Patient is a 49y old  Male who presents with a chief complaint of Cough (21 Apr 2020 11:35)    HPI:  48 y/o M with no significant PMH, p/w cough, runny nose and sore throat for 5 days. Patient tested COVID positive on 4/17/20. Patient states that he has been having SOB for 2-3 weeks. Also developed substernal CP 2 days ago, has some radiation to abdomen, but denies radiation to arm / jaw / back. C/o 3 episodes of vomiting. Denies diarrhea, abdominal pain. +Fever / chills.     PSH: Denies   PMH: as above    Meds: per reconciliation sheet, noted below  MEDICATIONS  (STANDING):  cefTRIAXone Injectable.      enoxaparin Injectable 100 milliGRAM(s) SubCutaneous every 12 hours  folic acid 1 milliGRAM(s) Oral daily  guaiFENesin ER 1200 milliGRAM(s) Oral every 12 hours  hydroxychloroquine   Oral   methylPREDNISolone sodium succinate Injectable 40 milliGRAM(s) IV Push every 12 hours  multivitamin 1 Tablet(s) Oral daily  thiamine 100 milliGRAM(s) Oral daily    Allergies    No Known Allergies    Intolerances      Social: no smoking, no alcohol, no illegal drugs; no recent travel, no exposure to TB  FAMILY HISTORY:     no history of premature cardiovascular disease in first degree relatives    ROS: the patient denies fever, no chills, no HA, no dizziness, no sore throat, no blurry vision, no CP, no palpitations, no abdominal pain, no diarrhea, no N/V, no dysuria, no leg pain, no claudication, no rash, no joint aches, no rectal pain or bleeding, no night sweats  All other systems reviewed and are negative    Vital Signs Last 24 Hrs  T(C): 37.4 (21 Apr 2020 11:20), Max: 38.4 (21 Apr 2020 04:10)  T(F): 99.4 (21 Apr 2020 11:20), Max: 101.1 (21 Apr 2020 04:10)  HR: 75 (21 Apr 2020 11:20) (75 - 103)  BP: 131/84 (21 Apr 2020 11:20) (131/84 - 153/80)  BP(mean): --  RR: 20 (21 Apr 2020 11:20) (20 - 36)  SpO2: 100% (21 Apr 2020 11:20) (82% - 100%)  Daily Height in cm: 175.26 (21 Apr 2020 00:39)    Daily     PE:  Constitutional: NAD, on NRB  HEENT: NC/AT, EOMI, PERRLA, conjunctivae clear; ears and nose atraumatic; pharynx benign  Neck: supple; thyroid not palpable  Back: no tenderness  Respiratory: decreased breath sounds  Cardiovascular: S1S2 regular, no murmurs  Abdomen: soft, not tender, not distended, positive BS; liver and spleen WNL  Genitourinary: no suprapubic tenderness  Lymphatic: no LN palpable  Musculoskeletal: no muscle tenderness, no joint swelling or tenderness  Extremities: no pedal edema  Neurological/ Psychiatric: moving all extremities  Skin: no rashes; no palpable lesions    Labs: all available labs reviewed                        12.1   9.64  )-----------( 232      ( 21 Apr 2020 07:20 )             34.4     04-21    131<L>  |  99  |  7   ----------------------------<  116<H>  3.9   |  24  |  0.74    Ca    8.0<L>      21 Apr 2020 07:20  Phos  2.6     04-21  Mg     2.3     04-21    TPro  7.0  /  Alb  2.5<L>  /  TBili  0.6  /  DBili  x   /  AST  91<H>  /  ALT  101<H>  /  AlkPhos  64  04-21     LIVER FUNCTIONS - ( 21 Apr 2020 07:20 )  Alb: 2.5 g/dL / Pro: 7.0 gm/dL / ALK PHOS: 64 U/L / ALT: 101 U/L / AST: 91 U/L / GGT: x               Radiology: all available radiological tests reviewed    Advanced directives addressed: full resuscitation

## 2020-04-21 NOTE — CONSULT NOTE ADULT - ASSESSMENT
A/P: 50 y/o M with no significant PMH, p/w cough, runny nose and sore throat for 5 days. Patient tested COVID positive on 4/17/20 p/w CP.    1. Sepsis. COVID+. Cont abx, tx as per ID. Supportive care.     2. CP. Likely related to sepsis. Doubt coronary ischemia.     3. +Troponins. Likely demand from COVID. Medical mgmt.   2Decho pending to assess LV fxn. No ischemic eval at this time.   No dynamic changes on EKG noted. Monitor on tele.     4. Anticoagulation. CTA, LE dopplers negative. Cont as per ID.    5. ETOH abuse. Mgmt as per primary team.     6. DVT proph. 12-Sep-2017 15:46

## 2020-04-21 NOTE — ED ADULT NURSE NOTE - OBJECTIVE STATEMENT
Ambulatory from home complaining of SOB and O2 saturation on the pulse ox that he was sent home with 3 days ago to be in the 80's, discharged COVID-19 (+). Patient 82% in triage, NAD noted, calm/cooperative, breathing even and unlabored, respiration rate normal, no accessory muscle use. Reports mild cough. 18g peripheral IV inserted to L AC, labs drawn and sent, medicated as ordered. Pending MD okeefe. Continuous cardiac monitoring in place. Safety maintained, needs attended, will continue to monitor.

## 2020-04-21 NOTE — ED PROVIDER NOTE - CARE PLAN
Principal Discharge DX:	Hypoxia  Secondary Diagnosis:	Shortness of breath  Secondary Diagnosis:	COVID-19 virus detected

## 2020-04-21 NOTE — ED ADULT NURSE NOTE - CHIEF COMPLAINT QUOTE
short of breath + covid oxygen saturation at triage on room air 77-82% ( pt has pulse ox that we gave him). Daughters name Kiana Jones [phone] please call for information / updates

## 2020-04-21 NOTE — PROGRESS NOTE ADULT - SUBJECTIVE AND OBJECTIVE BOX
Chief Complaint: SOB, chest pain     Subjective and objective   50 y/o M with no significant PMH, p/w cough, runny nose and sore throat for 5 days. Patient tested COVID positive on 4/17/20. Patient states that he has been having SOB for 2-3 weeks. Also developed substernal CP 2 days ago, has some radiation to abdomen, but denies radiation to arm / jaw / back. C/o 3 episodes of vomiting. Denies diarrhea, abdominal pain. +Fever / chills.     4/21 - chest pain free, on NSM, will trend trops, TTE, Cardiac consult, start Rocephin, ID consult     REVIEW OF SYSTEMS:    All other review of systems is negative unless indicated above    Vital Signs Last 24 Hrs  T(C): 37.4 (21 Apr 2020 11:20), Max: 38.4 (21 Apr 2020 04:10)  T(F): 99.4 (21 Apr 2020 11:20), Max: 101.1 (21 Apr 2020 04:10)  HR: 75 (21 Apr 2020 11:20) (75 - 103)  BP: 131/84 (21 Apr 2020 11:20) (131/84 - 153/80)  BP(mean): --  RR: 20 (21 Apr 2020 11:20) (20 - 36)  SpO2: 100% (21 Apr 2020 11:20) (82% - 100%)    I&O's Summary    21 Apr 2020 07:01  -  21 Apr 2020 11:35  --------------------------------------------------------  IN: 0 mL / OUT: 1000 mL / NET: -1000 mL  PHYSICAL EXAM:    Constitutional: NAD, awake and alert, well-developed  HEENT: PERR, EOMI, Normal Hearing, MMM  Neck: Soft and supple, No LAD, No JVD  Respiratory: Breath sounds are decreased bilaterally, B/L fine rales  Cardiovascular: S1 and S2, regular rate and rhythm, no Murmurs, gallops or rubs  Gastrointestinal: Bowel Sounds present, soft, nontender, nondistended, no guarding, no rebound  Extremities: No peripheral edema  Vascular: 2+ peripheral pulses  Neurological: A/O x 3, no focal deficits  Musculoskeletal: 5/5 strength b/l upper and lower extremities  Skin: No rashes    Medications:  MEDICATIONS  (STANDING):  cefTRIAXone Injectable.      enoxaparin Injectable 40 milliGRAM(s) SubCutaneous every 12 hours  folic acid 1 milliGRAM(s) Oral daily  hydroxychloroquine   Oral   methylPREDNISolone sodium succinate Injectable 40 milliGRAM(s) IV Push every 12 hours  multivitamin 1 Tablet(s) Oral daily  thiamine 100 milliGRAM(s) Oral daily      Labs: All Labs Reviewed:                        12.1   9.64  )-----------( 232      ( 21 Apr 2020 07:20 )             34.4     04-21    131<L>  |  99  |  7   ----------------------------<  116<H>  3.9   |  24  |  0.74    Ca    8.0<L>      21 Apr 2020 07:20  Phos  2.6     04-21  Mg     2.3     04-21    TPro  7.0  /  Alb  2.5<L>  /  TBili  0.6  /  DBili  x   /  AST  91<H>  /  ALT  101<H>  /  AlkPhos  64  04-21    PT/INR - ( 21 Apr 2020 07:20 )   PT: 14.3 sec;   INR: 1.28 ratio         PTT - ( 21 Apr 2020 07:20 )  PTT:25.3 sec    ABG - ( 21 Apr 2020 01:57 )  pH, Arterial: 7.48  pH, Blood: x     /  pCO2: 30    /  pO2: 161   / HCO3: 22    / Base Excess: -.8   /  SaO2: 99        CARDIAC MARKERS ( 21 Apr 2020 07:20 )  0.083 ng/mL / x     / 796 U/L / x     / x      CARDIAC MARKERS ( 21 Apr 2020 00:43 )  0.047 ng/mL / x     / 673 U/L / x     / x          Blood Culture:   Urine Culture:     RADIOLOGY/EKG:  < from: CT Angio Chest PE Protocol w/ IV Cont (04.21.20 @ 02:20) >  IMPRESSION:    Suboptimal pulmonary arterial opacification. No central pulmonary embolism. Segmental and subsegmental branches cannot be assessed.  Diffuse bilateral airspace and groundglass opacity suggesting atypical infection including COVID-19.    < end of copied text >      DVT PPX: lovenox 40 mg sq bid     Advance Directive: full code     Disposition: Cardiac/ID consult Chief Complaint: SOB, chest pain     Subjective and objective   48 y/o M with no significant PMH, p/w cough, runny nose and sore throat for 5 days. Patient tested COVID positive on 4/17/20. Patient states that he has been having SOB for 2-3 weeks. Also developed substernal CP 2 days ago, has some radiation to abdomen, but denies radiation to arm / jaw / back. C/o 3 episodes of vomiting. Denies diarrhea, abdominal pain. +Fever / chills.     4/21 - chest pain free, on NSM, will trend trops, TTE,    CTA -negative, however D-Dimer tending up - will order B/L LE doppler to r/o DVT  Cardiac consult, start Rocephin, ID consult     REVIEW OF SYSTEMS:    All other review of systems is negative unless indicated above    Vital Signs Last 24 Hrs  T(C): 37.4 (21 Apr 2020 11:20), Max: 38.4 (21 Apr 2020 04:10)  T(F): 99.4 (21 Apr 2020 11:20), Max: 101.1 (21 Apr 2020 04:10)  HR: 75 (21 Apr 2020 11:20) (75 - 103)  BP: 131/84 (21 Apr 2020 11:20) (131/84 - 153/80)  BP(mean): --  RR: 20 (21 Apr 2020 11:20) (20 - 36)  SpO2: 100% (21 Apr 2020 11:20) (82% - 100%)    I&O's Summary    21 Apr 2020 07:01  -  21 Apr 2020 11:35  --------------------------------------------------------  IN: 0 mL / OUT: 1000 mL / NET: -1000 mL  PHYSICAL EXAM:    Constitutional: NAD, awake and alert, well-developed  HEENT: PERR, EOMI, Normal Hearing, MMM  Neck: Soft and supple, No LAD, No JVD  Respiratory: Breath sounds are decreased bilaterally, B/L fine rales  Cardiovascular: S1 and S2, regular rate and rhythm, no Murmurs, gallops or rubs  Gastrointestinal: Bowel Sounds present, soft, nontender, nondistended, no guarding, no rebound  Extremities: No peripheral edema  Vascular: 2+ peripheral pulses  Neurological: A/O x 3, no focal deficits  Musculoskeletal: 5/5 strength b/l upper and lower extremities  Skin: No rashes    Medications:  MEDICATIONS  (STANDING):  cefTRIAXone Injectable.      enoxaparin Injectable 40 milliGRAM(s) SubCutaneous every 12 hours  folic acid 1 milliGRAM(s) Oral daily  hydroxychloroquine   Oral   methylPREDNISolone sodium succinate Injectable 40 milliGRAM(s) IV Push every 12 hours  multivitamin 1 Tablet(s) Oral daily  thiamine 100 milliGRAM(s) Oral daily      Labs: All Labs Reviewed:                        12.1   9.64  )-----------( 232      ( 21 Apr 2020 07:20 )             34.4     04-21    131<L>  |  99  |  7   ----------------------------<  116<H>  3.9   |  24  |  0.74    Ca    8.0<L>      21 Apr 2020 07:20  Phos  2.6     04-21  Mg     2.3     04-21    TPro  7.0  /  Alb  2.5<L>  /  TBili  0.6  /  DBili  x   /  AST  91<H>  /  ALT  101<H>  /  AlkPhos  64  04-21    PT/INR - ( 21 Apr 2020 07:20 )   PT: 14.3 sec;   INR: 1.28 ratio         PTT - ( 21 Apr 2020 07:20 )  PTT:25.3 sec    ABG - ( 21 Apr 2020 01:57 )  pH, Arterial: 7.48  pH, Blood: x     /  pCO2: 30    /  pO2: 161   / HCO3: 22    / Base Excess: -.8   /  SaO2: 99        CARDIAC MARKERS ( 21 Apr 2020 07:20 )  0.083 ng/mL / x     / 796 U/L / x     / x      CARDIAC MARKERS ( 21 Apr 2020 00:43 )  0.047 ng/mL / x     / 673 U/L / x     / x          Blood Culture:   Urine Culture:     RADIOLOGY/EKG:  < from: CT Angio Chest PE Protocol w/ IV Cont (04.21.20 @ 02:20) >  IMPRESSION:    Suboptimal pulmonary arterial opacification. No central pulmonary embolism. Segmental and subsegmental branches cannot be assessed.  Diffuse bilateral airspace and groundglass opacity suggesting atypical infection including COVID-19.    < end of copied text >      DVT PPX: lovenox 40 mg sq bid     Advance Directive: full code     Disposition: B/L LE US, Cardiac/ID consult

## 2020-04-21 NOTE — PROGRESS NOTE ADULT - ASSESSMENT
50 y/o M with no significant PMH, p/w cough, runny nose and sore throat for 5 day.  NP fluent in [place], assessed pt. in bed.     # Sepsis and Acute hypoxic respiratory failure 2/2 COVID 19 viral pneumonia  elevated D-Dimer and inflammatory markers  -Cont. Plaquenil, started as per hospital protocol  - Qtc = 450, will monitor, EKG in AM   -Patient is on NRB -> will start patient on steroids as per hospital protocol. Monitor FS  -Continuous pulse ox monitoring. Presently saturating at 98% at bedside on NRB  -CXR from 4/17 - B/L patchy airspace opacities   - Will start IV Rocephin as prophylaxis for superimposed GRN PNA    # Elevated D-dimer -  264->[phone] ->31->371   CTA negative for PE   - AC with lovenox 40 mg sq bid   -  Trend D-Dimer     # Elevated troponins and CPK  trop: 0.047->0.084  CPK: 674->796  suspect 2/2 demand ischemia in the setting of viral infection with COVID-19  - trend trops and EKG  - f/u TTE  - Cardiology consult pending     # Chest pain with elevated trops and CPK  - at present chest pain free   -Trend troponins and EKG  - Tele monitoring  - f/u TTE   - Cardiology consult pending      # ETOH - last drink 10 days ago , as per pt.  Alcohol blood <10  -Patient states he drinks 3-4 drinks per day  -No signs of withdrawal at this time, will monitor   -MVI / thiamine / folate  -If any signs of withdrawal appear -> will start Ativan PRN 48 y/o M with no significant PMH, p/w cough, runny nose and sore throat for 5 day.  NP fluent in [place], assessed pt. in bed.     # Sepsis and Acute hypoxic respiratory failure 2/2 COVID 19 viral pneumonia  elevated D-Dimer and inflammatory markers  -Cont. Plaquenil, started as per hospital protocol  - Qtc = 450, will monitor, EKG in AM   -Patient is on NRB -> will start patient on steroids as per hospital protocol. Monitor FS  -Continuous pulse ox monitoring. Presently saturating at 98% at bedside on NRB  -CXR from 4/17 - B/L patchy airspace opacities   - Will start IV Rocephin as prophylaxis for superimposed GRN PNA  - ID consult appreciated     # Elevated D-dimer -  264->[id number] ->31->371   CTA negative for PE   - order B/L LE US to r/o DVT   - AC with lovenox 40 mg sq bid   -  Trend D-Dimer     # Elevated troponins and CPK  trop: 0.047->0.084  CPK: 674->796  suspect 2/2 demand ischemia in the setting of viral infection with COVID-19  - trend trops and EKG  - f/u TTE  - Cardiology consult pending     # Chest pain with elevated trops and CPK  - at present chest pain free   -Trend troponins and EKG  - Tele monitoring  - f/u TTE   - Cardiology consult pending      # ETOH - last drink 10 days ago , as per pt.  Alcohol blood <10  -Patient states he drinks 3-4 drinks per day  -No signs of withdrawal at this time, will monitor   -MVI / thiamine / folate  -If any signs of withdrawal appear -> will start Ativan PRN 50 y/o M with no significant PMH, p/w cough, runny nose and sore throat for 5 day.  NP fluent in [place], assessed pt. in bed.     # Sepsis and Acute hypoxic respiratory failure 2/2 COVID 19 viral pneumonia  elevated D-Dimer and inflammatory markers  -Cont. Plaquenil, started as per hospital protocol  - Qtc = 450, will monitor, EKG in AM   -Patient is on NRB -> will start patient on steroids as per hospital protocol. Monitor FS  -Continuous pulse ox monitoring. Presently saturating at 98% at bedside on NRB  -CXR from 4/17 - B/L patchy airspace opacities   - Will start IV Rocephin as prophylaxis for superimposed GRN PNA  - ID consult appreciated     # Elevated D-dimer -  264->[phone] ->99295   CTA negative for PE   - order B/L LE US to r/o DVT   - AC with lovenox 40 mg sq bid   -  Trend D-Dimer     # Elevated troponins and CPK  trop: 0.047->0.084  CPK: 674->796  suspect 2/2 demand ischemia in the setting of viral infection with COVID-19  - trend trops and EKG  - f/u TTE  - Cardiology consult pending     # Chest pain with elevated trops and CPK  - at present chest pain free   -Trend troponins and EKG  - Tele monitoring  - f/u TTE   - Cardiology consult pending      # ETOH - last drink 10 days ago , as per pt.  Alcohol blood <10  -Patient states he drinks 3-4 drinks per day  -No signs of withdrawal at this time, will monitor   -MVI / thiamine / folate  -If any signs of withdrawal appear -> will start Ativan PRN 50 y/o M with no significant PMH, p/w cough, runny nose and sore throat for 5 day.  NP fluent in [place], assessed pt. in bed.     # Sepsis and Acute hypoxic respiratory failure 2/2 COVID 19 viral pneumonia  elevated D-Dimer and inflammatory markers  -Cont. Plaquenil, started as per hospital protocol  - Qtc = 450, will monitor, EKG in AM   -Patient is on NRB -> will start patient on steroids as per hospital protocol. Monitor FS  -Continuous pulse ox monitoring. Presently saturating at 98% at bedside on NRB  -CXR from 4/17 - B/L patchy airspace opacities   - Will start IV Rocephin as prophylaxis for superimposed GRN PNA  - ID consult appreciated     # Elevated D-dimer -  264->65534 ->61346   CTA negative for PE   - order B/L LE US to r/o DVT   - AC with lovenox 40 mg sq bid   -  Trend D-Dimer     # Elevated troponins and CPK  trop: 0.047->0.084  CPK: 674->796  suspect 2/2 demand ischemia in the setting of viral infection with COVID-19  - trend trops and EKG  - f/u TTE  - Cardiology consult pending     # Chest pain with elevated trops and CPK  - at present chest pain free   -Trend troponins and EKG  - Tele monitoring  - f/u TTE   - Cardiology consult pending      # Hyponatremia - Fe=269-443  in the setting of viral infection with COVID-19  - improved  - monitor Na, BMP in AM     # ETOH - last drink 10 days ago , as per pt.  Alcohol blood <10  -Patient states he drinks 3-4 drinks per day  -No signs of withdrawal at this time, will monitor   -MVI / thiamine / folate  -If any signs of withdrawal appear -> will start Ativan PRN 48 y/o M with no significant PMH, p/w cough, runny nose and sore throat for 5 day.  NP fluent in [place], assessed pt. in bed.     # Sepsis and Acute hypoxic respiratory failure 2/2 COVID 19 viral pneumonia  elevated D-Dimer and inflammatory markers  -Cont. Plaquenil, started as per hospital protocol  - Qtc = 450, will monitor, EKG in AM   -Patient is on NRB -> will start patient on steroids as per hospital protocol. Monitor FS  -Continuous pulse ox monitoring. Presently saturating at 98% at bedside on NRB  -CXR from 4/17 - B/L patchy airspace opacities and CTA showing B/L ground glass opacities   - Will start IV Rocephin and IV Doxy as prophylaxis for superimposed GRN PNA  - ID consult appreciated     # Elevated D-dimer -  264->[phone] ->58348   CTA suboptimal, no central PE, however segmental and subsegmental branches not assessed   - order B/L LE US to r/o DVT   - change AC from prophylaxis to treatment dose with lovenox 100 mg sq q12hrs  -  Trend D-Dimer     # Elevated troponins and CPK  trop: 0.047->0.084  CPK: 674->796  suspect 2/2 demand ischemia in the setting of viral infection with COVID-19  - trend trops and EKG  - f/u TTE  - Cardiology consult pending     # Chest pain with elevated trops and CPK  - at present chest pain free   -Trend troponins and EKG  - Tele monitoring  - f/u TTE   - Cardiology consult pending      # Hyponatremia - Hn=958-115  in the setting of viral infection with COVID-19  - improved  - monitor Na, BMP in AM     # ETOH - last drink 10 days ago , as per pt.  Alcohol blood <10  -Patient states he drinks 3-4 drinks per day  -No signs of withdrawal at this time, will monitor   -MVI / thiamine / folate  -If any signs of withdrawal appear -> will start Ativan PRN

## 2020-04-22 ENCOUNTER — TRANSCRIPTION ENCOUNTER (OUTPATIENT)
Age: 50
End: 2020-04-22

## 2020-04-22 LAB
ALBUMIN SERPL ELPH-MCNC: 2.4 G/DL — LOW (ref 3.3–5)
ALP SERPL-CCNC: 70 U/L — SIGNIFICANT CHANGE UP (ref 40–120)
ALT FLD-CCNC: 198 U/L — HIGH (ref 12–78)
ANION GAP SERPL CALC-SCNC: 8 MMOL/L — SIGNIFICANT CHANGE UP (ref 5–17)
AST SERPL-CCNC: 128 U/L — HIGH (ref 15–37)
BILIRUB SERPL-MCNC: 0.4 MG/DL — SIGNIFICANT CHANGE UP (ref 0.2–1.2)
BUN SERPL-MCNC: 15 MG/DL — SIGNIFICANT CHANGE UP (ref 7–23)
CALCIUM SERPL-MCNC: 8.5 MG/DL — SIGNIFICANT CHANGE UP (ref 8.5–10.1)
CHLORIDE SERPL-SCNC: 103 MMOL/L — SIGNIFICANT CHANGE UP (ref 96–108)
CK SERPL-CCNC: 295 U/L — SIGNIFICANT CHANGE UP (ref 26–308)
CO2 SERPL-SCNC: 25 MMOL/L — SIGNIFICANT CHANGE UP (ref 22–31)
CREAT SERPL-MCNC: 0.74 MG/DL — SIGNIFICANT CHANGE UP (ref 0.5–1.3)
GLUCOSE SERPL-MCNC: 171 MG/DL — HIGH (ref 70–99)
HCT VFR BLD CALC: 36 % — LOW (ref 39–50)
HGB BLD-MCNC: 12.8 G/DL — LOW (ref 13–17)
MCHC RBC-ENTMCNC: 29.9 PG — SIGNIFICANT CHANGE UP (ref 27–34)
MCHC RBC-ENTMCNC: 35.6 GM/DL — SIGNIFICANT CHANGE UP (ref 32–36)
MCV RBC AUTO: 84.1 FL — SIGNIFICANT CHANGE UP (ref 80–100)
PLATELET # BLD AUTO: 323 K/UL — SIGNIFICANT CHANGE UP (ref 150–400)
POTASSIUM SERPL-MCNC: 4 MMOL/L — SIGNIFICANT CHANGE UP (ref 3.5–5.3)
POTASSIUM SERPL-SCNC: 4 MMOL/L — SIGNIFICANT CHANGE UP (ref 3.5–5.3)
PROT SERPL-MCNC: 7.3 GM/DL — SIGNIFICANT CHANGE UP (ref 6–8.3)
RBC # BLD: 4.28 M/UL — SIGNIFICANT CHANGE UP (ref 4.2–5.8)
RBC # FLD: 12 % — SIGNIFICANT CHANGE UP (ref 10.3–14.5)
SODIUM SERPL-SCNC: 136 MMOL/L — SIGNIFICANT CHANGE UP (ref 135–145)
TROPONIN I SERPL-MCNC: 0.23 NG/ML — HIGH (ref 0.01–0.04)
WBC # BLD: 11.29 K/UL — HIGH (ref 3.8–10.5)
WBC # FLD AUTO: 11.29 K/UL — HIGH (ref 3.8–10.5)

## 2020-04-22 PROCEDURE — 99233 SBSQ HOSP IP/OBS HIGH 50: CPT

## 2020-04-22 PROCEDURE — 76700 US EXAM ABDOM COMPLETE: CPT | Mod: 26

## 2020-04-22 RX ORDER — RIVAROXABAN 15 MG-20MG
15 KIT ORAL
Refills: 0 | Status: DISCONTINUED | OUTPATIENT
Start: 2020-04-22 | End: 2020-04-28

## 2020-04-22 RX ORDER — FOLIC ACID 0.8 MG
1 TABLET ORAL
Qty: 0 | Refills: 0 | DISCHARGE
Start: 2020-04-22

## 2020-04-22 RX ORDER — ACETAMINOPHEN 500 MG
2 TABLET ORAL
Qty: 0 | Refills: 0 | DISCHARGE
Start: 2020-04-22

## 2020-04-22 RX ORDER — RIVAROXABAN 15 MG-20MG
1 KIT ORAL
Qty: 0 | Refills: 0 | DISCHARGE
Start: 2020-04-22

## 2020-04-22 RX ORDER — RIVAROXABAN 15 MG-20MG
1 KIT ORAL
Qty: 40 | Refills: 0
Start: 2020-04-22 | End: 2020-05-11

## 2020-04-22 RX ORDER — THIAMINE MONONITRATE (VIT B1) 100 MG
1 TABLET ORAL
Qty: 0 | Refills: 0 | DISCHARGE
Start: 2020-04-22

## 2020-04-22 RX ADMIN — Medication 100 MILLIGRAM(S): at 17:34

## 2020-04-22 RX ADMIN — Medication 1 TABLET(S): at 11:10

## 2020-04-22 RX ADMIN — Medication 1 MILLIGRAM(S): at 11:09

## 2020-04-22 RX ADMIN — Medication 400 MILLIGRAM(S): at 05:51

## 2020-04-22 RX ADMIN — Medication 1200 MILLIGRAM(S): at 05:51

## 2020-04-22 RX ADMIN — ENOXAPARIN SODIUM 100 MILLIGRAM(S): 100 INJECTION SUBCUTANEOUS at 05:52

## 2020-04-22 RX ADMIN — Medication 40 MILLIGRAM(S): at 17:34

## 2020-04-22 RX ADMIN — Medication 40 MILLIGRAM(S): at 05:52

## 2020-04-22 RX ADMIN — RIVAROXABAN 15 MILLIGRAM(S): KIT at 17:34

## 2020-04-22 RX ADMIN — Medication 100 MILLIGRAM(S): at 05:52

## 2020-04-22 RX ADMIN — Medication 100 MILLIGRAM(S): at 11:10

## 2020-04-22 RX ADMIN — Medication 1200 MILLIGRAM(S): at 17:34

## 2020-04-22 RX ADMIN — CEFTRIAXONE 1000 MILLIGRAM(S): 500 INJECTION, POWDER, FOR SOLUTION INTRAMUSCULAR; INTRAVENOUS at 08:25

## 2020-04-22 NOTE — PROGRESS NOTE ADULT - ASSESSMENT
48 y/o M with no significant PMH, p/w cough, runny nose and sore throat for 5 day.  NP fluent in [place], assessed pt. in bed.     # Sepsis and Acute hypoxic respiratory failure 2/2 COVID 19 viral pneumonia  elevated D-Dimer and inflammatory markers  respiratory status improved - off NRM - on 4 L NC with O2 Sat wnl   -Cont. Plaquenil, started as per hospital protocol - monitor QTC  - cont. Abx - Rocephin and doxy for a total of 7 days  - cont. steroids  - ID consult appreciated    # Elevated D-dimer -  264->14003 ->98751   CTA suboptimal, no central PE, however segmental and subsegmental branches not assessed   - B/L LE US  shows B/L LE DVT   - switch lovenox to DVT treatment dose of xarelto 15 mg po bid      # Elevated troponins and CPK  trop: 0.047->0.084  CPK: 674->796  suspect 2/2 demand ischemia in the setting of viral infection with COVID-19  - trend trops and EKG  - f/u TTE  - Cardiology consult pending     # Chest pain with elevated trops and CPK  - at present chest pain free   Trops trending down  TTE - wnl with EF 55%  Cardiology consult appreciated: likely demand from COVID, medical management      # Hyponatremia - Yf=869  resolved   - monitor Na, BMP in AM     # Leukocytosis/hyperglycemia  2/2 steroid use  - monitor   - CBC/BMP in AM     # ETOH - last drink 10 days ago , as per pt.  Alcohol blood <10  -Patient states he drinks 3-4 drinks per day  -No signs of withdrawal at this time, will monitor   -MVI / thiamine / folate  -If any signs of withdrawal appear -> will start Ativan PRN 50 y/o M with no significant PMH, p/w cough, runny nose and sore throat for 5 day.  NP fluent in [place], assessed pt. in bed.     # Sepsis and Acute hypoxic respiratory failure 2/2 COVID 19 viral pneumonia  elevated D-Dimer and inflammatory markers  respiratory status improved - off NRM - on 4 L NC with O2 Sat wnl   -Cont. Plaquenil, started as per hospital protocol - monitor QTC  - cont. Abx - Rocephin and doxy for a total of 7 days  - cont. steroids  - ID consult appreciated    # Elevated D-dimer -  264->[phone] ->46653   CTA suboptimal, no central PE, however segmental and subsegmental branches not assessed   - B/L LE US  shows B/L LE DVT   - switch lovenox to DVT treatment dose of xarelto 15 mg po bid      # Elevated troponins and CPK  trop: 0.047->0.084  CPK: 674->796  suspect 2/2 demand ischemia in the setting of viral infection with COVID-19  - trend trops and EKG  - f/u TTE  - Cardiology consult pending     # Chest pain with elevated trops and CPK  - at present chest pain free   Trops trending down  TTE - wnl with EF 55%  Cardiology consult appreciated: likely demand from COVID, medical management      # Hyponatremia - Zh=916  resolved   - monitor Na, BMP in AM     # Leukocytosis/hyperglycemia  2/2 steroid use  - monitor   - CBC/BMP in AM     # Transaminitis - liver enzymes trending up  suspect 2/2 Hx of ETOH and Abx use  - monitor - CMP in AM  - consider abdominal US if uptrend continues     # ETOH - last drink 10 days ago , as per pt.  Alcohol blood <10  -Patient states he drinks 3-4 drinks per day  -No signs of withdrawal at this time, will monitor   -MVI / thiamine / folate  -If any signs of withdrawal appear -> will start Ativan PRN 48 y/o M with no significant PMH, p/w cough, runny nose and sore throat for 5 day.  NP fluent in [place], assessed pt. in bed.     # Sepsis and Acute hypoxic respiratory failure 2/2 COVID 19 viral pneumonia  elevated D-Dimer and inflammatory markers  respiratory status improved - off NRM - on 4 L NC with O2 Sat wnl   -Cont. Plaquenil, started as per hospital protocol - monitor QTC  - cont. Abx - Rocephin and doxy for a total of 7 days  - cont. steroids  - ID consult appreciated    # Elevated D-dimer -  264->[phone] ->72710   CTA suboptimal, no central PE, however segmental and subsegmental branches not assessed   - B/L LE US  shows B/L LE DVT   - switch lovenox to DVT treatment dose of xarelto 15 mg po bid      # Elevated troponins and CPK  trop: 0.047->0.084  CPK: 674->796  suspect 2/2 demand ischemia in the setting of viral infection with COVID-19  - trend trops and EKG  - f/u TTE  - Cardiology consult pending     # Chest pain with elevated trops and CPK  - at present chest pain free   Trops trending down  TTE - wnl with EF 55%  Cardiology consult appreciated: likely demand from COVID, medical management      # Hyponatremia - Qc=209  resolved   - monitor Na, BMP in AM     # Leukocytosis/hyperglycemia  2/2 steroid use  - monitor   - CBC/BMP in AM     # Transaminitis - liver enzymes trending up  suspect 2/2 viral infection with COVID-19, Hx of ETOH and Abx use  - monitor - CMP in AM  - check hepatitis panel   - abdominal US     # ETOH - last drink 10 days ago , as per pt.  Alcohol blood <10  -Patient states he drinks 3-4 drinks per day  -No signs of withdrawal at this time, will monitor   -MVI / thiamine / folate  -If any signs of withdrawal appear -> will start Ativan PRN

## 2020-04-22 NOTE — DISCHARGE NOTE PROVIDER - NSDCCPTREATMENT_GEN_ALL_CORE_FT
PRINCIPAL PROCEDURE  Procedure: CTA chest w/w/o contrast  Findings and Treatment: IMPRESSION:  Suboptimal pulmonary arterial opacification. No central pulmonary embolism. Segmental and subsegmental branches cannot be assessed.  Diffuse bilateral airspace and groundglass opacity suggesting atypical infection including COVID-19.        SECONDARY PROCEDURE  Procedure: Abdominal ultrasound, limited  Findings and Treatment: IMPRESSION:   Hepatomegaly and hepatic steatosis.  Nocholelithiasis or biliary ductal dilatation.      Procedure: Venous doppler lower extremity  Findings and Treatment: IMPRESSION:   Positive for deep venous thrombosis in bilateral lower extremities (posterior tibial veins).

## 2020-04-22 NOTE — PROGRESS NOTE ADULT - ASSESSMENT
A/P: 48 y/o M with no significant PMH, p/w cough, runny nose and sore throat for 5 days. Patient tested COVID positive on 4/17/20 p/w CP.    1. Sepsis. COVID+. Cont abx, tx as per ID. Supportive care. Stable at this time.    2. CP. Likely related to sepsis. Doubt coronary ischemia. No CP this AM.    3. +Troponins. Likely demand from COVID. Medical mgmt.   2Decho pending to assess LV fxn. No ischemic eval at this time.   No dynamic changes on EKG noted. Monitor on tele.     4. Anticoagulation. CTA, LE dopplers negative. Cont LTA as per ID.    5. ETOH abuse. Mgmt as per primary team.     6. DVT proph.

## 2020-04-22 NOTE — DISCHARGE NOTE PROVIDER - NSDCMRMEDTOKEN_GEN_ALL_CORE_FT
acetaminophen 325 mg oral tablet: 2 tab(s) orally every 4 hours, As needed, Temp greater or equal to 38.5C (101.3F)  doxycycline monohydrate 100 mg oral capsule: 1 cap(s) orally every 12 hours  folic acid 1 mg oral tablet: 1 tab(s) orally once a day  Multiple Vitamins oral tablet: 1 tab(s) orally once a day  rivaroxaban 15 mg oral tablet: 1 tab(s) orally 2 times a day (with meals) for 42 doses total,  started on 4/22/2020  thiamine 100 mg oral tablet: 1 tab(s) orally once a day acetaminophen 325 mg oral tablet: 2 tab(s) orally every 4 hours, As needed, Temp greater or equal to 38.5C (101.3F)  folic acid 1 mg oral tablet: 1 tab(s) orally once a day  Multiple Vitamins oral tablet: 1 tab(s) orally once a day  pantoprazole 40 mg oral delayed release tablet: 1 tab(s) orally once a day (before a meal)  rivaroxaban 15 mg oral tablet: 1 tab(s) orally 2 times a day (with meals) for 30 more doses for  42 doses total,  Pt. has 12 doses given in the hospital   will start xarelo 20 mg po daily after finishing the 30 doses left of xarelto 15 mg po bid   Start xarelto 20 mg po dialy with dinner on 5/14/2020   rivaroxaban 20 mg oral tablet: 1 tab(s) orally once a day with dinner   Start 5/14/2020 after finshing the 30 doses of   xarelto 15 mg po bid   thiamine 100 mg oral tablet: 1 tab(s) orally once a day acetaminophen 325 mg oral tablet: 2 tab(s) orally every 4 hours, As needed, Temp greater or equal to 38.5C (101.3F)  folic acid 1 mg oral tablet: 1 tab(s) orally once a day  Multiple Vitamins oral tablet: 1 tab(s) orally once a day  pantoprazole 40 mg oral delayed release tablet: 1 tab(s) orally once a day (before a meal)  rivaroxaban 15 mg oral tablet: 1 tab(s) orally 2 times a day (with meals) for 30 more doses for  42 doses total,  Pt. has 12 doses given in the hospital   will start xarelo 20 mg po daily after finishing the 30 doses left of xarelto 15 mg po bid   Start xarelto 20 mg po dialy with dinner on 5/14/2020   rivaroxaban 15 mg oral tablet: 1 tab(s) orally 2 times a day (with meals)  rivaroxaban 20 mg oral tablet: 1 tab(s) orally once a day with dinner   Start 5/14/2020 after finshing the 30 doses of   xarelto 15 mg po bid   thiamine 100 mg oral tablet: 1 tab(s) orally once a day acetaminophen 325 mg oral tablet: 2 tab(s) orally every 4 hours, As needed, Temp greater or equal to 38.5C (101.3F)  folic acid 1 mg oral tablet: 1 tab(s) orally once a day  Multiple Vitamins oral tablet: 1 tab(s) orally once a day  pantoprazole 40 mg oral delayed release tablet: 1 tab(s) orally once a day (before a meal)  rivaroxaban 15 mg oral tablet: 1 tab(s) orally 2 times a day (with meals)  for 30 more doses and then start rivaroxaban 20 mg po daily with dinner on 5/14/2020  thiamine 100 mg oral tablet: 1 tab(s) orally once a day

## 2020-04-22 NOTE — PROGRESS NOTE ADULT - SUBJECTIVE AND OBJECTIVE BOX
Date of service: 04-22-20 @ 10:57    pt seen and examined  feels better  on NC, denies sob  afebrile    ROS:  denies dizziness, no HA, no abdominal pain, no diarrhea or constipation; no dysuria, no urinary frequency, no legs pain, no rashes    MEDICATIONS  (STANDING):  cefTRIAXone Injectable.      cefTRIAXone Injectable. 1000 milliGRAM(s) IV Push every 24 hours  doxycycline hyclate Capsule 100 milliGRAM(s) Oral every 12 hours  enoxaparin Injectable 100 milliGRAM(s) SubCutaneous every 12 hours  folic acid 1 milliGRAM(s) Oral daily  guaiFENesin ER 1200 milliGRAM(s) Oral every 12 hours  hydroxychloroquine 400 milliGRAM(s) Oral every 24 hours  hydroxychloroquine   Oral   methylPREDNISolone sodium succinate Injectable 40 milliGRAM(s) IV Push every 12 hours  multivitamin 1 Tablet(s) Oral daily  thiamine 100 milliGRAM(s) Oral daily      Vital Signs Last 24 Hrs  T(C): 36.6 (22 Apr 2020 10:53), Max: 37.4 (21 Apr 2020 11:20)  T(F): 97.8 (22 Apr 2020 10:53), Max: 99.4 (21 Apr 2020 11:20)  HR: 78 (22 Apr 2020 10:53) (66 - 78)  BP: 123/77 (22 Apr 2020 10:53) (123/77 - 131/84)  BP(mean): --  RR: 18 (22 Apr 2020 10:53) (18 - 20)  SpO2: 93% (22 Apr 2020 10:53) (93% - 100%)    PE:  Constitutional: NAD, on NRB  HEENT: NC/AT, EOMI, PERRLA, conjunctivae clear; ears and nose atraumatic; pharynx benign  Neck: supple; thyroid not palpable  Back: no tenderness  Respiratory: decreased breath sounds  Cardiovascular: S1S2 regular, no murmurs  Abdomen: soft, not tender, not distended, positive BS; liver and spleen WNL  Genitourinary: no suprapubic tenderness  Lymphatic: no LN palpable  Musculoskeletal: no muscle tenderness, no joint swelling or tenderness  Extremities: no pedal edema  Neurological/ Psychiatric: moving all extremities  Skin: no rashes; no palpable lesions    Labs: all available labs reviewed                                   12.8   11.29 )-----------( 323      ( 22 Apr 2020 08:53 )             36.0     04-22    136  |  103  |  15  ----------------------------<  171<H>  4.0   |  25  |  0.74    Ca    8.5      22 Apr 2020 08:53  Phos  2.6     04-21  Mg     2.3     04-21    TPro  7.3  /  Alb  2.4<L>  /  TBili  0.4  /  DBili  x   /  AST  128<H>  /  ALT  198<H>  /  AlkPhos  70  04-22           C-Reactive Protein, Serum: 30.06 mg/dL (04-21-20 @ 07:20)  Ferritin, Serum: 1124 ng/mL (04-21-20 @ 07:20)  D-Dimer Assay, Quantitative: 02744 ng/mL DDU (04-21-20 @ 07:20)  C-Reactive Protein, Serum: 28.04 mg/dL (04-21-20 @ 01:12)  Ferritin, Serum: 1179 ng/mL (04-21-20 @ 01:12)  D-Dimer Assay, Quantitative: 29070 ng/mL DDU (04-21-20 @ 00:43)  C-Reactive Protein, Serum: 18.22 mg/dL (04-17-20 @ 19:49)  D-Dimer Assay, Quantitative: 264 ng/mL DDU (04-17-20 @ 19:49)  Ferritin, Serum: 1262 ng/mL (04-17-20 @ 19:49)      Radiology: all available radiological tests reviewed    < from: US Duplex Venous Lower Ext Complete, Bilateral (04.21.20 @ 13:28) >  EXAM:  US DPLX LWR EXT VEINS COMPL BI                            PROCEDURE DATE:  04/21/2020          INTERPRETATION:  CLINICAL INFORMATION: Leg pain    COMPARISON: None available.    TECHNIQUE: Duplex sonography of the BILATERAL LOWER extremity veins with color and spectral Doppler, with and without compression.      FINDINGS:    There is normal compressibility of the bilateral common femoral, femoral and popliteal veins.     Doppler examination shows normal spontaneous and phasic flow.    Bilateral calf vein thrombosis is detected in the posterior tibial veins.    IMPRESSION:     Positive for deep venous thrombosis in bilateral lower extremities (posterior tibial veins)      Advanced directives addressed: full resuscitation Date of service: 04-22-20 @ 10:57    pt seen and examined  feels better  on NC, denies sob  afebrile    ROS:  denies dizziness, no HA, no abdominal pain, no diarrhea or constipation; no dysuria, no urinary frequency, no legs pain, no rashes    MEDICATIONS  (STANDING):  cefTRIAXone Injectable.      cefTRIAXone Injectable. 1000 milliGRAM(s) IV Push every 24 hours  doxycycline hyclate Capsule 100 milliGRAM(s) Oral every 12 hours  enoxaparin Injectable 100 milliGRAM(s) SubCutaneous every 12 hours  folic acid 1 milliGRAM(s) Oral daily  guaiFENesin ER 1200 milliGRAM(s) Oral every 12 hours  hydroxychloroquine 400 milliGRAM(s) Oral every 24 hours  hydroxychloroquine   Oral   methylPREDNISolone sodium succinate Injectable 40 milliGRAM(s) IV Push every 12 hours  multivitamin 1 Tablet(s) Oral daily  thiamine 100 milliGRAM(s) Oral daily      Vital Signs Last 24 Hrs  T(C): 36.6 (22 Apr 2020 10:53), Max: 37.4 (21 Apr 2020 11:20)  T(F): 97.8 (22 Apr 2020 10:53), Max: 99.4 (21 Apr 2020 11:20)  HR: 78 (22 Apr 2020 10:53) (66 - 78)  BP: 123/77 (22 Apr 2020 10:53) (123/77 - 131/84)  BP(mean): --  RR: 18 (22 Apr 2020 10:53) (18 - 20)  SpO2: 93% (22 Apr 2020 10:53) (93% - 100%)    PE:  Constitutional: NAD, on NC  HEENT: NC/AT, EOMI, PERRLA, conjunctivae clear; ears and nose atraumatic; pharynx benign  Neck: supple; thyroid not palpable  Back: no tenderness  Respiratory: decreased breath sounds  Cardiovascular: S1S2 regular, no murmurs  Abdomen: soft, not tender, not distended, positive BS; liver and spleen WNL  Genitourinary: no suprapubic tenderness  Lymphatic: no LN palpable  Musculoskeletal: no muscle tenderness, no joint swelling or tenderness  Extremities: no pedal edema  Neurological/ Psychiatric: moving all extremities  Skin: no rashes; no palpable lesions    Labs: all available labs reviewed                                   12.8   11.29 )-----------( 323      ( 22 Apr 2020 08:53 )             36.0     04-22    136  |  103  |  15  ----------------------------<  171<H>  4.0   |  25  |  0.74    Ca    8.5      22 Apr 2020 08:53  Phos  2.6     04-21  Mg     2.3     04-21    TPro  7.3  /  Alb  2.4<L>  /  TBili  0.4  /  DBili  x   /  AST  128<H>  /  ALT  198<H>  /  AlkPhos  70  04-22           C-Reactive Protein, Serum: 30.06 mg/dL (04-21-20 @ 07:20)  Ferritin, Serum: 1124 ng/mL (04-21-20 @ 07:20)  D-Dimer Assay, Quantitative: 95370 ng/mL DDU (04-21-20 @ 07:20)  C-Reactive Protein, Serum: 28.04 mg/dL (04-21-20 @ 01:12)  Ferritin, Serum: 1179 ng/mL (04-21-20 @ 01:12)  D-Dimer Assay, Quantitative: 40243 ng/mL DDU (04-21-20 @ 00:43)  C-Reactive Protein, Serum: 18.22 mg/dL (04-17-20 @ 19:49)  D-Dimer Assay, Quantitative: 264 ng/mL DDU (04-17-20 @ 19:49)  Ferritin, Serum: 1262 ng/mL (04-17-20 @ 19:49)      Radiology: all available radiological tests reviewed    < from: US Duplex Venous Lower Ext Complete, Bilateral (04.21.20 @ 13:28) >  EXAM:  US DPLX LWR EXT VEINS COMPL BI                            PROCEDURE DATE:  04/21/2020          INTERPRETATION:  CLINICAL INFORMATION: Leg pain    COMPARISON: None available.    TECHNIQUE: Duplex sonography of the BILATERAL LOWER extremity veins with color and spectral Doppler, with and without compression.      FINDINGS:    There is normal compressibility of the bilateral common femoral, femoral and popliteal veins.     Doppler examination shows normal spontaneous and phasic flow.    Bilateral calf vein thrombosis is detected in the posterior tibial veins.    IMPRESSION:     Positive for deep venous thrombosis in bilateral lower extremities (posterior tibial veins)      Advanced directives addressed: full resuscitation

## 2020-04-22 NOTE — PHARMACOTHERAPY INTERVENTION NOTE - COMMENTS
Confirmed outpatient coverage of Xarelto - $600 due to high deductible plan; provided coupon for $0 copay

## 2020-04-22 NOTE — DISCHARGE NOTE PROVIDER - NSDCCPCAREPLAN_GEN_ALL_CORE_FT
PRINCIPAL DISCHARGE DIAGNOSIS  Diagnosis: Acute hypoxemic respiratory failure  Assessment and Plan of Treatment: 2/2 viral infection with COVID-19  finished coruse of seroids and Abx, needs O2 supplementation 2 L NC prn at night, O2 was delivered at home      SECONDARY DISCHARGE DIAGNOSES  Diagnosis: Elevated liver enzymes  Assessment and Plan of Treatment: Abdominal US show fatty liver due to alcohol abuse  improved after IV hydration  Educate on alcohol cessation    Diagnosis: DVT, lower extremity  Assessment and Plan of Treatment: B/L LE US shows B/L LE DVT  cont. xarelto 15 mg po bid for 30 more doses and then start xarelto 20 mg po daily with dinner on 5/14/20

## 2020-04-22 NOTE — DISCHARGE NOTE PROVIDER - HOSPITAL COURSE
50 y/o M with no significant PMH, p/w cough, runny nose and sore throat for 5 day.    NP fluent in Hebrew, assessed pt. in bed. Pt. with sepsis and Acute hypoxic respiratory failure 2/2 COVID 19 viral pneumonia elevated D-Dimer and inflammatory markers. CTA suboptimal, no central PE, however segmental and subsegmental branches not assessed . B/L LE US  shows B/L LE DVT . pt. was started on AC with xarelto .     Pt. finished coruse of Abx, plaquenil and steroids, symptoms improved.    Pt. is COVID-19 positive, ICD-10 is U07.1.     Pt. has acute diagnosis  of COVID-19 and requires home O2 to increase hospital capacity and mitigate risk.    Pt . is stable for discharge home with home care today.        Vital Signs Last 24 Hrs    T(C): 36.9 (28 Apr 2020 04:41), Max: 37.1 (27 Apr 2020 20:25)    T(F): 98.5 (28 Apr 2020 04:41), Max: 98.8 (27 Apr 2020 20:25)    HR: 78 (28 Apr 2020 04:41) (73 - 78)    BP: 106/65 (28 Apr 2020 04:41) (106/57 - 117/56)    BP(mean): --    RR: 18 (28 Apr 2020 04:41) (18 - 18)    SpO2: 93% (28 Apr 2020 04:41) (92% - 96%)        PHYSICAL EXAM:        Constitutional: NAD, awake and alert, well-developed    HEENT: PERR, EOMI, Normal Hearing, MMM    Neck: Soft and supple, No LAD, No JVD    Respiratory: Breath sounds are decreased bilaterally, No wheezing, rales or rhonchi    Cardiovascular: S1 and S2, regular rate and rhythm, no Murmurs, gallops or rubs    Gastrointestinal: Bowel Sounds present, soft, nontender, nondistended, no guarding, no rebound    Extremities: No peripheral edema    Vascular: 2+ peripheral pulses    Neurological: A/O x 3, no focal deficits    Musculoskeletal: 5/5 strength b/l upper and lower extremities    Skin: No rashes                Plan:        # Sepsis and Acute hypoxic respiratory failure 2/2 COVID 19 viral pneumonia    elevated D-Dimer and inflammatory markers    respiratory status improved - off NRM - on 3 L NC with O2 Sat wnl     on plaquenil #3/5 off label use, monitor qtc closely    - finished course of plaquenil and Abx with improvement in symptoms     - on full dose ac for DVT/possible PE     - ID consult appreciated     Pt. is stable for d/c home, awaiting for O2 to be delivered home -         # Elevated D-dimer -  264->83375 ->99942->6579->7920    CTA suboptimal, no central PE, however segmental and subsegmental branches not assessed     - B/L LE US  shows B/L LE DVT     - cont. xarelto 15 mg po bid  for a total of 42 doses     - cont.  PPI for GI prophylaxis         # Elevated troponins and CPK - trending down     trop: 0.047->0.084->0.379->0.233    CPK: 674->796    suspect 2/2 demand ischemia in the setting of viral infection with COVID-19    - trend trops and EKG    - f/u TTE    - Cardiology consult appreciated: No active cardiac issues at this time , would DC tele         # Chest pain with elevated trops and CPK    - at present chest pain free     Trops trending down    TTE - wnl with EF 55%    Cardiology consult appreciated: likely demand from COVID, medical management,. d/c tele          # Hyponatremia - improved    monitor Na, BMP in AM          # Leukocytosis/hyperglycemia - resolved     2/2 steroid use    - monitor WBC        # Transaminitis - liver enzymes trending down     suspect 2/2 viral infection with COVID-19, Hx of ETOH and Abx use    - abdominal US shows steatosis most likely due to ETOH    - hepatitis panel - negative    - pt. will be counseled regarding cutting drinking         # ETOH - last drink 10 days ago , as per pt.    Alcohol blood <10    -Patient states he drinks 3-4 drinks per day    -No signs of withdrawal at this time, will monitor     -MVI / thiamine / folate

## 2020-04-22 NOTE — PROGRESS NOTE ADULT - ASSESSMENT
50 y/o M with no significant PMH, p/w cough, runny nose and sore throat for 5 days. Patient tested COVID positive on 4/17/20. Patient states that he has been having SOB for 2-3 weeks. Also developed substernal CP 2 days ago, has some radiation to abdomen, but denies radiation to arm / jaw / back. C/o 3 episodes of vomiting. Denies diarrhea, abdominal pain. +Fever / chills.  Here febrile 101, wbc ct 12.9, CTA no central PE, b/l infiltrates, hypoxic requiring NRB, was given IV steroids/rocephin/doxycycline/plaquenil.     1. acute hypoxic resp failure. viral syndrome. COVID-19. pneumonia. b/l DVT/possible PE  - resp status slowly improving   - on plaquenil #2/5 off label use, monitor qtc closely  - on rocephin 1gm daily   - on doxycycline 100mg BID   - abx for superimposed bacterial coverage  - on IV solumedrol #2  - on full dose ac for DVT/possible PE   - monitor temps  - monitor resp status, if o2 sat <92% >6 hrs, elevated inflammatory markers, will consider addition of anakinra for treatment of secondary HLH/cytokine storm d/t covid  - f/u cbc  - tolerating abx well so far; no side effects noted  - reason for abx use and side effects reviewed with patient  - supportive care    2. other issues - care per medicine

## 2020-04-22 NOTE — DISCHARGE NOTE PROVIDER - CARE PROVIDER_API CALL
Corinne Simental (NP)  NP in Adult Health  [address] [place] [address], [place] [address]  Phone: [phone]  Fax: [phone]  Follow Up Time:

## 2020-04-23 LAB
ALBUMIN SERPL ELPH-MCNC: 2.4 G/DL — LOW (ref 3.3–5)
ALP SERPL-CCNC: 81 U/L — SIGNIFICANT CHANGE UP (ref 40–120)
ALT FLD-CCNC: 198 U/L — HIGH (ref 12–78)
ANION GAP SERPL CALC-SCNC: 7 MMOL/L — SIGNIFICANT CHANGE UP (ref 5–17)
AST SERPL-CCNC: 62 U/L — HIGH (ref 15–37)
BILIRUB SERPL-MCNC: 0.4 MG/DL — SIGNIFICANT CHANGE UP (ref 0.2–1.2)
BUN SERPL-MCNC: 20 MG/DL — SIGNIFICANT CHANGE UP (ref 7–23)
CALCIUM SERPL-MCNC: 8.6 MG/DL — SIGNIFICANT CHANGE UP (ref 8.5–10.1)
CHLORIDE SERPL-SCNC: 103 MMOL/L — SIGNIFICANT CHANGE UP (ref 96–108)
CO2 SERPL-SCNC: 26 MMOL/L — SIGNIFICANT CHANGE UP (ref 22–31)
CREAT SERPL-MCNC: 0.68 MG/DL — SIGNIFICANT CHANGE UP (ref 0.5–1.3)
CRP SERPL-MCNC: 9.33 MG/DL — HIGH (ref 0–0.4)
D DIMER BLD IA.RAPID-MCNC: 6579 NG/ML DDU — HIGH
FERRITIN SERPL-MCNC: 1239 NG/ML — HIGH (ref 30–400)
GLUCOSE SERPL-MCNC: 137 MG/DL — HIGH (ref 70–99)
HAV IGM SER-ACNC: SIGNIFICANT CHANGE UP
HBV CORE IGM SER-ACNC: SIGNIFICANT CHANGE UP
HBV SURFACE AG SER-ACNC: SIGNIFICANT CHANGE UP
HCT VFR BLD CALC: 37.8 % — LOW (ref 39–50)
HCV AB S/CO SERPL IA: 0.17 S/CO — SIGNIFICANT CHANGE UP (ref 0–0.99)
HCV AB SERPL-IMP: SIGNIFICANT CHANGE UP
HGB BLD-MCNC: 13 G/DL — SIGNIFICANT CHANGE UP (ref 13–17)
IBUPROFEN SERPL-MCNC: <1 CD:431152031 — LOW (ref 10–50)
LDH SERPL L TO P-CCNC: 550 U/L — HIGH (ref 84–241)
MCHC RBC-ENTMCNC: 29.2 PG — SIGNIFICANT CHANGE UP (ref 27–34)
MCHC RBC-ENTMCNC: 34.4 GM/DL — SIGNIFICANT CHANGE UP (ref 32–36)
MCV RBC AUTO: 84.9 FL — SIGNIFICANT CHANGE UP (ref 80–100)
PLATELET # BLD AUTO: 374 K/UL — SIGNIFICANT CHANGE UP (ref 150–400)
POTASSIUM SERPL-MCNC: 4.3 MMOL/L — SIGNIFICANT CHANGE UP (ref 3.5–5.3)
POTASSIUM SERPL-SCNC: 4.3 MMOL/L — SIGNIFICANT CHANGE UP (ref 3.5–5.3)
PROT SERPL-MCNC: 6.9 GM/DL — SIGNIFICANT CHANGE UP (ref 6–8.3)
RBC # BLD: 4.45 M/UL — SIGNIFICANT CHANGE UP (ref 4.2–5.8)
RBC # FLD: 12.1 % — SIGNIFICANT CHANGE UP (ref 10.3–14.5)
SODIUM SERPL-SCNC: 136 MMOL/L — SIGNIFICANT CHANGE UP (ref 135–145)
WBC # BLD: 15 K/UL — HIGH (ref 3.8–10.5)
WBC # FLD AUTO: 15 K/UL — HIGH (ref 3.8–10.5)

## 2020-04-23 PROCEDURE — 93010 ELECTROCARDIOGRAM REPORT: CPT

## 2020-04-23 PROCEDURE — 99232 SBSQ HOSP IP/OBS MODERATE 35: CPT

## 2020-04-23 RX ORDER — PANTOPRAZOLE SODIUM 20 MG/1
40 TABLET, DELAYED RELEASE ORAL
Refills: 0 | Status: DISCONTINUED | OUTPATIENT
Start: 2020-04-23 | End: 2020-04-28

## 2020-04-23 RX ADMIN — Medication 650 MILLIGRAM(S): at 05:14

## 2020-04-23 RX ADMIN — Medication 40 MILLIGRAM(S): at 05:12

## 2020-04-23 RX ADMIN — Medication 1200 MILLIGRAM(S): at 05:13

## 2020-04-23 RX ADMIN — CEFTRIAXONE 1000 MILLIGRAM(S): 500 INJECTION, POWDER, FOR SOLUTION INTRAMUSCULAR; INTRAVENOUS at 08:42

## 2020-04-23 RX ADMIN — Medication 1 TABLET(S): at 11:16

## 2020-04-23 RX ADMIN — RIVAROXABAN 15 MILLIGRAM(S): KIT at 08:42

## 2020-04-23 RX ADMIN — Medication 100 MILLIGRAM(S): at 11:15

## 2020-04-23 RX ADMIN — Medication 100 MILLIGRAM(S): at 17:00

## 2020-04-23 RX ADMIN — Medication 1 MILLIGRAM(S): at 11:16

## 2020-04-23 RX ADMIN — RIVAROXABAN 15 MILLIGRAM(S): KIT at 17:00

## 2020-04-23 RX ADMIN — Medication 400 MILLIGRAM(S): at 05:12

## 2020-04-23 RX ADMIN — Medication 100 MILLIGRAM(S): at 05:13

## 2020-04-23 RX ADMIN — Medication 1200 MILLIGRAM(S): at 17:00

## 2020-04-23 NOTE — PROGRESS NOTE ADULT - ASSESSMENT
48 y/o M with no significant PMH, p/w cough, runny nose and sore throat for 5 day.  NP fluent in Kittitian, assessed pt. in bed.     # Sepsis and Acute hypoxic respiratory failure 2/2 COVID 19 viral pneumonia  elevated D-Dimer and inflammatory markers  respiratory status improved - off NRM - on 4 L NC with O2 Sat wnl   on plaquenil #3/5 off label use, monitor qtc closely  - on rocephin 1gm daily #3/5  - on doxycycline 100mg BID #3/5  - abx for superimposed bacterial coverage  - on IV solumedrol #3  - on full dose ac for DVT/possible PE   - monitor temps, leukocytosis prob 2/2 steroids   - d/c steroids   - ID consult appreciated:     # Elevated D-dimer -  264->53814 ->41182->6579  significant dropin D-Dimer  CTA suboptimal, no central PE, however segmental and subsegmental branches not assessed   - B/L LE US  shows B/L LE DVT   - cont. xarelto 15 mg po bid  for a total of 42 doses   - add PPI for GI prophylaxis     # Elevated troponins and CPK  trop: 0.047->0.084  CPK: 674->796  suspect 2/2 demand ischemia in the setting of viral infection with COVID-19  - trend trops and EKG  - f/u TTE  - Cardiology consult appreciated: No active cardiac issues at this time , would DC tele     # Chest pain with elevated trops and CPK  - at present chest pain free   Trops trending down  TTE - wnl with EF 55%  Cardiology consult appreciated: likely demand from COVID, medical management,. d/c tele      # Hyponatremia - Hv=430  resolved   - monitor Na, BMP in AM     # Leukocytosis/hyperglycemia  2/2 steroid use  - d/c steroids  - CBC/BMP in AM     # Transaminitis - liver enzymes trending up  suspect 2/2 viral infection with COVID-19, Hx of ETOH and Abx use  - abdominal US shows steatosis most likely due to ETOH  - hepatitis panel - negative  - pt. will be counseled regarding cutting drinking     # ETOH - last drink 10 days ago , as per pt.  Alcohol blood <10  -Patient states he drinks 3-4 drinks per day  -No signs of withdrawal at this time, will monitor   -MVI / thiamine / folate    # Dispo: d/c home tomorrow with home O2

## 2020-04-23 NOTE — PROGRESS NOTE ADULT - SUBJECTIVE AND OBJECTIVE BOX
Patient is a 49y old  Male who presents with a chief complaint of Cough (22 Apr 2020 13:50)      4/23- "I feel Ok" denies CP or SOB   MEDICATIONS  (STANDING):  cefTRIAXone Injectable.      cefTRIAXone Injectable. 1000 milliGRAM(s) IV Push every 24 hours  doxycycline hyclate Capsule 100 milliGRAM(s) Oral every 12 hours  folic acid 1 milliGRAM(s) Oral daily  guaiFENesin ER 1200 milliGRAM(s) Oral every 12 hours  hydroxychloroquine 400 milliGRAM(s) Oral every 24 hours  hydroxychloroquine   Oral   multivitamin 1 Tablet(s) Oral daily  rivaroxaban 15 milliGRAM(s) Oral two times a day with meals  thiamine 100 milliGRAM(s) Oral daily    MEDICATIONS  (PRN):  acetaminophen   Tablet .. 650 milliGRAM(s) Oral every 4 hours PRN Temp greater or equal to 38.5C (101.3F)            Vital Signs Last 24 Hrs  T(C): 37.8 (23 Apr 2020 04:41), Max: 37.8 (23 Apr 2020 04:41)  T(F): 100.1 (23 Apr 2020 04:41), Max: 100.1 (23 Apr 2020 04:41)  HR: 65 (23 Apr 2020 04:41) (65 - 78)  BP: 149/94 (23 Apr 2020 04:41) (123/77 - 149/94)  BP(mean): --  RR: 20 (23 Apr 2020 04:41) (18 - 20)  SpO2: 95% (23 Apr 2020 04:41) (93% - 97%)            INTERPRETATION OF TELEMETRY:  SB ST no arrhytmias  ECG:        LABS:                        13.0   15.00 )-----------( 374      ( 23 Apr 2020 07:13 )             37.8     04-23    136  |  103  |  20  ----------------------------<  137<H>  4.3   |  26  |  0.68    Ca    8.6      23 Apr 2020 07:13    TPro  6.9  /  Alb  2.4<L>  /  TBili  0.4  /  DBili  x   /  AST  62<H>  /  ALT  198<H>  /  AlkPhos  81  04-23    CARDIAC MARKERS ( 22 Apr 2020 08:53 )  0.233 ng/mL / x     / 295 U/L / x     / x      CARDIAC MARKERS ( 21 Apr 2020 17:24 )  0.379 ng/mL / x     / 661 U/L / x     / x              I&O's Summary    BNP  RADIOLOGY & ADDITIONAL STUDIES:

## 2020-04-23 NOTE — PROGRESS NOTE ADULT - SUBJECTIVE AND OBJECTIVE BOX
Date of service: 04-23-20 @ 11:21    pt seen and examined  on NC, denies sob  afebrile  no o/n events    ROS:  denies dizziness, no HA, no abdominal pain, no diarrhea or constipation; no dysuria, no urinary frequency, no legs pain, no rashes      MEDICATIONS  (STANDING):  cefTRIAXone Injectable.      cefTRIAXone Injectable. 1000 milliGRAM(s) IV Push every 24 hours  doxycycline hyclate Capsule 100 milliGRAM(s) Oral every 12 hours  folic acid 1 milliGRAM(s) Oral daily  guaiFENesin ER 1200 milliGRAM(s) Oral every 12 hours  hydroxychloroquine 400 milliGRAM(s) Oral every 24 hours  hydroxychloroquine   Oral   multivitamin 1 Tablet(s) Oral daily  rivaroxaban 15 milliGRAM(s) Oral two times a day with meals  thiamine 100 milliGRAM(s) Oral daily    Vital Signs Last 24 Hrs  T(C): 37.8 (23 Apr 2020 04:41), Max: 37.8 (23 Apr 2020 04:41)  T(F): 100.1 (23 Apr 2020 04:41), Max: 100.1 (23 Apr 2020 04:41)  HR: 65 (23 Apr 2020 04:41) (65 - 72)  BP: 149/94 (23 Apr 2020 04:41) (132/83 - 149/94)  BP(mean): --  RR: 20 (23 Apr 2020 04:41) (20 - 20)  SpO2: 95% (23 Apr 2020 04:41) (95% - 97%)      PE:  Constitutional: NAD, on NC  HEENT: NC/AT, EOMI, PERRLA, conjunctivae clear; ears and nose atraumatic; pharynx benign  Neck: supple; thyroid not palpable  Back: no tenderness  Respiratory: decreased breath sounds  Cardiovascular: S1S2 regular, no murmurs  Abdomen: soft, not tender, not distended, positive BS; liver and spleen WNL  Genitourinary: no suprapubic tenderness  Lymphatic: no LN palpable  Musculoskeletal: no muscle tenderness, no joint swelling or tenderness  Extremities: no pedal edema  Neurological/ Psychiatric: moving all extremities  Skin: no rashes; no palpable lesions    Labs: all available labs reviewed                                   13.0   15.00 )-----------( 374      ( 23 Apr 2020 07:13 )             37.8     04-23    136  |  103  |  20  ----------------------------<  137<H>  4.3   |  26  |  0.68    Ca    8.6      23 Apr 2020 07:13    TPro  6.9  /  Alb  2.4<L>  /  TBili  0.4  /  DBili  x   /  AST  62<H>  /  ALT  198<H>  /  AlkPhos  81  04-23        C-Reactive Protein, Serum: 30.06 mg/dL (04-21-20 @ 07:20)  Ferritin, Serum: 1124 ng/mL (04-21-20 @ 07:20)  D-Dimer Assay, Quantitative: 23625 ng/mL DDU (04-21-20 @ 07:20)  C-Reactive Protein, Serum: 28.04 mg/dL (04-21-20 @ 01:12)  Ferritin, Serum: 1179 ng/mL (04-21-20 @ 01:12)  D-Dimer Assay, Quantitative: 45196 ng/mL DDU (04-21-20 @ 00:43)  C-Reactive Protein, Serum: 18.22 mg/dL (04-17-20 @ 19:49)  D-Dimer Assay, Quantitative: 264 ng/mL DDU (04-17-20 @ 19:49)  Ferritin, Serum: 1262 ng/mL (04-17-20 @ 19:49)      Radiology: all available radiological tests reviewed      EXAM:  US DPLX LWR EXT VEINS COMPL BI                            PROCEDURE DATE:  04/21/2020          INTERPRETATION:  CLINICAL INFORMATION: Leg pain    COMPARISON: None available.    TECHNIQUE: Duplex sonography of the BILATERAL LOWER extremity veins with color and spectral Doppler, with and without compression.      FINDINGS:    There is normal compressibility of the bilateral common femoral, femoral and popliteal veins.     Doppler examination shows normal spontaneous and phasic flow.    Bilateral calf vein thrombosis is detected in the posterior tibial veins.    IMPRESSION:     Positive for deep venous thrombosis in bilateral lower extremities (posterior tibial veins)      Advanced directives addressed: full resuscitation

## 2020-04-23 NOTE — PROGRESS NOTE ADULT - ASSESSMENT
A/P: 50 y/o M with no significant PMH, p/w cough, runny nose and sore throat for 5 days. Patient tested COVID positive on 4/17/20 p/w CP.    1. Sepsis. COVID+. Cont abx, tx as per ID. Supportive care. Stable at this time.    2. CP. Likely related to sepsis. Doubt coronary ischemia. No CP this AM.    3. +Troponins. Likely demand from COVID. Medical mgmt.   2Decho shows preseverved EF No ischemic eval at this time.   No dynamic changes on EKG noted. Monitor on tele.     4. Anticoagulation. CTA, LE dopplers B/L DVT on full AC with DOAC     5. ETOH abuse. Mgmt as per primary team.     6. DVT proph.  No active cardiac issues at this time , would DC tele . Please call us back for further questions

## 2020-04-23 NOTE — PROGRESS NOTE ADULT - ASSESSMENT
50 y/o M with no significant PMH, p/w cough, runny nose and sore throat for 5 days. Patient tested COVID positive on 4/17/20. Patient states that he has been having SOB for 2-3 weeks. Also developed substernal CP 2 days ago, has some radiation to abdomen, but denies radiation to arm / jaw / back. C/o 3 episodes of vomiting. Denies diarrhea, abdominal pain. +Fever / chills.  Here febrile 101, wbc ct 12.9, CTA no central PE, b/l infiltrates, hypoxic requiring NRB, was given IV steroids/rocephin/doxycycline/plaquenil.     1. acute hypoxic resp failure. viral syndrome. COVID-19. pneumonia. b/l DVT/possible PE  - resp status/inflammatory markers slowly improving   - on plaquenil #3/5 off label use, monitor qtc closely  - on rocephin 1gm daily #3/5  - on doxycycline 100mg BID #3/5  - abx for superimposed bacterial coverage  - on IV solumedrol #3  - on full dose ac for DVT/possible PE   - monitor temps, leukocytosis prob d/t steroids f/u cbc  - tolerating abx well so far; no side effects noted  - reason for abx use and side effects reviewed with patient  - supportive care    2. other issues - care per medicine

## 2020-04-23 NOTE — PROGRESS NOTE ADULT - SUBJECTIVE AND OBJECTIVE BOX
Chief Complaint: cough     Subjective and objective   50 y/o M with no significant PMH, p/w cough, runny nose and sore throat for 5 days. Patient tested COVID positive on 4/17/20. Patient states that he has been having SOB for 2-3 weeks. Also developed substernal CP 2 days ago, has some radiation to abdomen, but denies radiation to arm / jaw / back. C/o 3 episodes of vomiting. Denies diarrhea, abdominal pain. +Fever / chills.     4/21 - chest pain free, on NSM, will trend trops, TTE,  CTA -negative, however D-Dimer tending up -B/L LE US - B/L DVT, started on treatment dose lovenox  started on Abx - IV Rocephin and doxy po for superrimposed GNR PNA; ID consult, Cardiac consult for elevated trops  4/22 - AC switched to xarelto treatment dose, pt. improving. off NRM, on 4L NC  4/23 - d/c steroids, d/c tele as per cardiology, d/c planning home with O2 tomorrow     REVIEW OF SYSTEMS:  All other review of systems is negative unless indicated above    Vital Signs Last 24 Hrs  T(C): 35.9 (23 Apr 2020 11:00), Max: 37.8 (23 Apr 2020 04:41)  T(F): 96.7 (23 Apr 2020 11:00), Max: 100.1 (23 Apr 2020 04:41)  HR: 58 (23 Apr 2020 11:00) (58 - 72)  BP: 136/89 (23 Apr 2020 11:00) (132/83 - 149/94)  BP(mean): --  RR: 20 (23 Apr 2020 04:41) (20 - 20)  SpO2: 94% (23 Apr 2020 11:00) (94% - 97%)      PHYSICAL EXAM:    Constitutional: NAD, awake and alert, well-developed  HEENT: PERR, EOMI, Normal Hearing, MMM  Neck: Soft and supple, No LAD, No JVD  Respiratory: Breath sounds are decreased bilaterally   Cardiovascular: S1 and S2, regular rate and rhythm, no Murmurs, gallops or rubs  Gastrointestinal: Bowel Sounds present, soft, nontender, nondistended, no guarding, no rebound  Extremities: No peripheral edema  Vascular: 2+ peripheral pulses  Neurological: A/O x 3, no focal deficits  Musculoskeletal: 5/5 strength b/l upper and lower extremities  Skin: No rashes    Medications:  MEDICATIONS  (STANDING):  cefTRIAXone Injectable.      cefTRIAXone Injectable. 1000 milliGRAM(s) IV Push every 24 hours  doxycycline hyclate Capsule 100 milliGRAM(s) Oral every 12 hours  folic acid 1 milliGRAM(s) Oral daily  guaiFENesin ER 1200 milliGRAM(s) Oral every 12 hours  hydroxychloroquine 400 milliGRAM(s) Oral every 24 hours  hydroxychloroquine   Oral   multivitamin 1 Tablet(s) Oral daily  pantoprazole    Tablet 40 milliGRAM(s) Oral before breakfast  rivaroxaban 15 milliGRAM(s) Oral two times a day with meals  thiamine 100 milliGRAM(s) Oral daily      Labs: All Labs Reviewed:                        13.0   15.00 )-----------( 374      ( 23 Apr 2020 07:13 )             37.8     04-23    136  |  103  |  20  ----------------------------<  137<H>  4.3   |  26  |  0.68    Ca    8.6      23 Apr 2020 07:13    TPro  6.9  /  Alb  2.4<L>  /  TBili  0.4  /  DBili  x   /  AST  62<H>  /  ALT  198<H>  /  AlkPhos  81  04-23          CARDIAC MARKERS ( 22 Apr 2020 08:53 )  0.233 ng/mL / x     / 295 U/L / x     / x      CARDIAC MARKERS ( 21 Apr 2020 17:24 )  0.379 ng/mL / x     / 661 U/L / x     / x          Blood Culture: 04-21 @ 01:12  Organism --  Gram Stain Blood -- Gram Stain --  Specimen Source .Blood None  Culture-Blood --    No growth to date.    Urine Culture: Organism: --  gram stain: --  04-21 @ 01:12     No growth to date.      RADIOLOGY/EKG:  < from: US Abdomen Complete (04.22.20 @ 16:07) >  IMPRESSION:     Hepatomegaly and hepatic steatosis.  Nocholelithiasis or biliary ductal dilatation.    < end of copied text >      DVT PPX: xarelto    Advance Directive: full code     Disposition: check stool fo C.diff

## 2020-04-24 LAB
ANION GAP SERPL CALC-SCNC: 6 MMOL/L — SIGNIFICANT CHANGE UP (ref 5–17)
BUN SERPL-MCNC: 19 MG/DL — SIGNIFICANT CHANGE UP (ref 7–23)
CALCIUM SERPL-MCNC: 8.2 MG/DL — LOW (ref 8.5–10.1)
CHLORIDE SERPL-SCNC: 101 MMOL/L — SIGNIFICANT CHANGE UP (ref 96–108)
CO2 SERPL-SCNC: 27 MMOL/L — SIGNIFICANT CHANGE UP (ref 22–31)
CREAT SERPL-MCNC: 0.82 MG/DL — SIGNIFICANT CHANGE UP (ref 0.5–1.3)
D DIMER BLD IA.RAPID-MCNC: 7920 NG/ML DDU — HIGH
GLUCOSE SERPL-MCNC: 95 MG/DL — SIGNIFICANT CHANGE UP (ref 70–99)
HCT VFR BLD CALC: 39 % — SIGNIFICANT CHANGE UP (ref 39–50)
HGB BLD-MCNC: 13.5 G/DL — SIGNIFICANT CHANGE UP (ref 13–17)
MCHC RBC-ENTMCNC: 29.5 PG — SIGNIFICANT CHANGE UP (ref 27–34)
MCHC RBC-ENTMCNC: 34.6 GM/DL — SIGNIFICANT CHANGE UP (ref 32–36)
MCV RBC AUTO: 85.2 FL — SIGNIFICANT CHANGE UP (ref 80–100)
PLATELET # BLD AUTO: 327 K/UL — SIGNIFICANT CHANGE UP (ref 150–400)
POTASSIUM SERPL-MCNC: 4.1 MMOL/L — SIGNIFICANT CHANGE UP (ref 3.5–5.3)
POTASSIUM SERPL-SCNC: 4.1 MMOL/L — SIGNIFICANT CHANGE UP (ref 3.5–5.3)
RBC # BLD: 4.58 M/UL — SIGNIFICANT CHANGE UP (ref 4.2–5.8)
RBC # FLD: 12 % — SIGNIFICANT CHANGE UP (ref 10.3–14.5)
SODIUM SERPL-SCNC: 134 MMOL/L — LOW (ref 135–145)
WBC # BLD: 11.75 K/UL — HIGH (ref 3.8–10.5)
WBC # FLD AUTO: 11.75 K/UL — HIGH (ref 3.8–10.5)

## 2020-04-24 PROCEDURE — 99233 SBSQ HOSP IP/OBS HIGH 50: CPT

## 2020-04-24 RX ADMIN — RIVAROXABAN 15 MILLIGRAM(S): KIT at 10:44

## 2020-04-24 RX ADMIN — Medication 1200 MILLIGRAM(S): at 18:13

## 2020-04-24 RX ADMIN — CEFTRIAXONE 1000 MILLIGRAM(S): 500 INJECTION, POWDER, FOR SOLUTION INTRAMUSCULAR; INTRAVENOUS at 10:43

## 2020-04-24 RX ADMIN — PANTOPRAZOLE SODIUM 40 MILLIGRAM(S): 20 TABLET, DELAYED RELEASE ORAL at 05:30

## 2020-04-24 RX ADMIN — Medication 1 MILLIGRAM(S): at 10:45

## 2020-04-24 RX ADMIN — Medication 100 MILLIGRAM(S): at 10:44

## 2020-04-24 RX ADMIN — RIVAROXABAN 15 MILLIGRAM(S): KIT at 18:13

## 2020-04-24 RX ADMIN — Medication 1 TABLET(S): at 10:44

## 2020-04-24 RX ADMIN — Medication 100 MILLIGRAM(S): at 18:13

## 2020-04-24 RX ADMIN — Medication 400 MILLIGRAM(S): at 05:30

## 2020-04-24 RX ADMIN — Medication 650 MILLIGRAM(S): at 23:25

## 2020-04-24 RX ADMIN — Medication 1200 MILLIGRAM(S): at 05:30

## 2020-04-24 RX ADMIN — Medication 100 MILLIGRAM(S): at 05:30

## 2020-04-24 NOTE — PROGRESS NOTE ADULT - SUBJECTIVE AND OBJECTIVE BOX
Date of service: 04-24-20 @ 12:00    pt seen and examined  sats on 4-5 L NC, > 92%  afebrile  feels better, denies sob/resp distress  no o/n events    ROS:  denies dizziness, no HA, no abdominal pain, no diarrhea or constipation; no dysuria, no urinary frequency, no legs pain, no rashes    MEDICATIONS  (STANDING):  cefTRIAXone Injectable.      cefTRIAXone Injectable. 1000 milliGRAM(s) IV Push every 24 hours  doxycycline hyclate Capsule 100 milliGRAM(s) Oral every 12 hours  folic acid 1 milliGRAM(s) Oral daily  guaiFENesin ER 1200 milliGRAM(s) Oral every 12 hours  hydroxychloroquine 400 milliGRAM(s) Oral every 24 hours  hydroxychloroquine   Oral   multivitamin 1 Tablet(s) Oral daily  pantoprazole    Tablet 40 milliGRAM(s) Oral before breakfast  rivaroxaban 15 milliGRAM(s) Oral two times a day with meals  thiamine 100 milliGRAM(s) Oral daily      Vital Signs Last 24 Hrs  T(C): 36.8 (24 Apr 2020 11:10), Max: 36.8 (24 Apr 2020 11:10)  T(F): 98.2 (24 Apr 2020 11:10), Max: 98.2 (24 Apr 2020 11:10)  HR: 91 (24 Apr 2020 11:10) (63 - 91)  BP: 123/66 (24 Apr 2020 11:10) (123/66 - 143/90)  BP(mean): 79 (24 Apr 2020 11:10) (79 - 79)  RR: 20 (24 Apr 2020 11:10) (20 - 20)  SpO2: 87% (24 Apr 2020 11:10) (87% - 93%)      PE:  Constitutional: NAD, on NC  HEENT: NC/AT, EOMI, PERRLA, conjunctivae clear; ears and nose atraumatic; pharynx benign  Neck: supple; thyroid not palpable  Back: no tenderness  Respiratory: decreased breath sounds  Cardiovascular: S1S2 regular, no murmurs  Abdomen: soft, not tender, not distended, positive BS; liver and spleen WNL  Genitourinary: no suprapubic tenderness  Lymphatic: no LN palpable  Musculoskeletal: no muscle tenderness, no joint swelling or tenderness  Extremities: no pedal edema  Neurological/ Psychiatric: moving all extremities  Skin: no rashes; no palpable lesions    Labs: all available labs reviewed                                              13.5   11.75 )-----------( 327      ( 24 Apr 2020 07:26 )             39.0     04-24    134<L>  |  101  |  19  ----------------------------<  95  4.1   |  27  |  0.82    Ca    8.2<L>      24 Apr 2020 07:26    TPro  6.9  /  Alb  2.4<L>  /  TBili  0.4  /  DBili  x   /  AST  62<H>  /  ALT  198<H>  /  AlkPhos  81  04-23           Cultures:     D-Dimer Assay, Quantitative: 7920 ng/mL DDU (04-24-20 @ 07:26)  D-Dimer Assay, Quantitative: 6579 ng/mL DDU (04-23-20 @ 07:13)  Ferritin, Serum: 1239 ng/mL (04-23-20 @ 07:13)  C-Reactive Protein, Serum: 9.33 mg/dL (04-23-20 @ 07:13)  C-Reactive Protein, Serum: 30.06 mg/dL (04-21-20 @ 07:20)  Ferritin, Serum: 1124 ng/mL (04-21-20 @ 07:20)  D-Dimer Assay, Quantitative: 57172 ng/mL DDU (04-21-20 @ 07:20)  C-Reactive Protein, Serum: 28.04 mg/dL (04-21-20 @ 01:12)  Ferritin, Serum: 1179 ng/mL (04-21-20 @ 01:12)  D-Dimer Assay, Quantitative: 64909 ng/mL DDU (04-21-20 @ 00:43)  C-Reactive Protein, Serum: 18.22 mg/dL (04-17-20 @ 19:49)  D-Dimer Assay, Quantitative: 264 ng/mL DDU (04-17-20 @ 19:49)  Ferritin, Serum: 1262 ng/mL (04-17-20 @ 19:49)      Radiology: all available radiological tests reviewed      EXAM:  US DPLX LWR EXT VEINS COMPL BI                            PROCEDURE DATE:  04/21/2020          INTERPRETATION:  CLINICAL INFORMATION: Leg pain    COMPARISON: None available.    TECHNIQUE: Duplex sonography of the BILATERAL LOWER extremity veins with color and spectral Doppler, with and without compression.      FINDINGS:    There is normal compressibility of the bilateral common femoral, femoral and popliteal veins.     Doppler examination shows normal spontaneous and phasic flow.    Bilateral calf vein thrombosis is detected in the posterior tibial veins.    IMPRESSION:     Positive for deep venous thrombosis in bilateral lower extremities (posterior tibial veins)      Advanced directives addressed: full resuscitation

## 2020-04-24 NOTE — PROGRESS NOTE ADULT - ASSESSMENT
48 y/o M with no significant PMH, p/w cough, runny nose and sore throat for 5 day.  NP fluent in Thai, assessed pt. in bed. Pt. with sepsis and Acute hypoxic respiratory failure 2/2 COVID 19 viral pneumonia  elevated D-Dimer and inflammatory markers, teated with Abx, plaquenil and steroids, symptoms improved.  Pt. is COVID-19 positive, ICD-10 is U07.1.   Pt. has acute diagnossi of COVID-19 and requires home O2 to increase hospital capacity and mitigate risk.     # Sepsis and Acute hypoxic respiratory failure 2/2 COVID 19 viral pneumonia  elevated D-Dimer and inflammatory markers  respiratory status improved - off NRM - on 4 L NC with O2 Sat wnl   on plaquenil #3/5 off label use, monitor qtc closely  - on rocephin 1gm daily #3/5  - on doxycycline 100mg BID #3/5  - abx for superimposed bacterial coverage  - on IV solumedrol #3  - on full dose ac for DVT/possible PE   - monitor temps, leukocytosis prob 2/2 steroids   - d/c steroids   - ID consult appreciated:     # Elevated D-dimer -  264->[id number] ->32663->6579  significant dropin D-Dimer  CTA suboptimal, no central PE, however segmental and subsegmental branches not assessed   - B/L LE US  shows B/L LE DVT   - cont. xarelto 15 mg po bid  for a total of 42 doses   - add PPI for GI prophylaxis     # Elevated troponins and CPK  trop: 0.047->0.084  CPK: 674->796  suspect 2/2 demand ischemia in the setting of viral infection with COVID-19  - trend trops and EKG  - f/u TTE  - Cardiology consult appreciated: No active cardiac issues at this time , would DC tele     # Chest pain with elevated trops and CPK  - at present chest pain free   Trops trending down  TTE - wnl with EF 55%  Cardiology consult appreciated: likely demand from COVID, medical management,. d/c tele      # Hyponatremia - Ar=660  improve PO intake      # Leukocytosis/hyperglycemia  2/2 steroid use  - d/c steroids  - improved     # Transaminitis - liver enzymes trending up  suspect 2/2 viral infection with COVID-19, Hx of ETOH and Abx use  - abdominal US shows steatosis most likely due to ETOH  - hepatitis panel - negative  - pt. will be counseled regarding cutting drinking     # ETOH - last drink 10 days ago , as per pt.  Alcohol blood <10  -Patient states he drinks 3-4 drinks per day  -No signs of withdrawal at this time, will monitor   -MVI / thiamine / folate 48 y/o M with no significant PMH, p/w cough, runny nose and sore throat for 5 day.  NP fluent in [place]yanese, assessed pt. in bed. Pt. with sepsis and Acute hypoxic respiratory failure 2/2 COVID 19 viral pneumonia  elevated D-Dimer and inflammatory markers, teated with Abx, plaquenil and steroids, symptoms improved.  Pt. is COVID-19 positive, ICD-10 is U07.1.   Pt. has acute diagnossi of COVID-19 and requires home O2 to increase hospital capacity and mitigate risk.     # Sepsis and Acute hypoxic respiratory failure 2/2 COVID 19 viral pneumonia  elevated D-Dimer and inflammatory markers  respiratory status improved - off NRM - on 4 L NC with O2 Sat wnl   on plaquenil #3/5 off label use, monitor qtc closely  - on rocephin 1gm daily #4/5  - on doxycycline 100mg BID #4/5  - abx for superimposed bacterial coverage  - on full dose ac for DVT/possible PE   - monitor temps, leukocytosis prob 2/2 steroids which is improving   - d/c steroids   - ID consult appreciated - not a candidate for Anakinra yet, will monitor over the weekend     # Elevated D-dimer -  264->45719 ->60485->6579->7920  CTA suboptimal, no central PE, however segmental and subsegmental branches not assessed   - B/L LE US  shows B/L LE DVT   - cont. xarelto 15 mg po bid  for a total of 42 doses   - cont.  PPI for GI prophylaxis   - trend D-Dimer     # Elevated troponins and CPK - trending down   trop: 0.047->0.084->0.379->0.233  CPK: 674->796  suspect 2/2 demand ischemia in the setting of viral infection with COVID-19  - trend trops and EKG  - f/u TTE  - Cardiology consult appreciated: No active cardiac issues at this time , would DC tele     # Chest pain with elevated trops and CPK  - at present chest pain free   Trops trending down  TTE - wnl with EF 55%  Cardiology consult appreciated: likely demand from COVID, medical management,. d/c tele      # Hyponatremia - Ts=666  improve PO intake   monitor Na, BMP in AM      # Leukocytosis/hyperglycemia  2/2 steroid use  - d/c steroids  - improved     # Transaminitis - liver enzymes trending down   suspect 2/2 viral infection with COVID-19, Hx of ETOH and Abx use  - abdominal US shows steatosis most likely due to ETOH  - hepatitis panel - negative  - pt. will be counseled regarding cutting drinking     # ETOH - last drink 10 days ago , as per pt.  Alcohol blood <10  -Patient states he drinks 3-4 drinks per day  -No signs of withdrawal at this time, will monitor   -MVI / thiamine / folate

## 2020-04-24 NOTE — PROGRESS NOTE ADULT - ASSESSMENT
50 y/o M with no significant PMH, p/w cough, runny nose and sore throat for 5 days. Patient tested COVID positive on 4/17/20. Patient states that he has been having SOB for 2-3 weeks. Also developed substernal CP 2 days ago, has some radiation to abdomen, but denies radiation to arm / jaw / back. C/o 3 episodes of vomiting. Denies diarrhea, abdominal pain. +Fever / chills.  Here febrile 101, wbc ct 12.9, CTA no central PE, b/l infiltrates, hypoxic requiring NRB, was given IV steroids/rocephin/doxycycline/plaquenil.     1. acute hypoxic resp failure. viral syndrome. COVID-19. pneumonia. b/l DVT/possible PE  - resp status/inflammatory markers slowly improving   - wean 02 as tolerated   - on plaquenil #4/5 off label use, monitor qtc closely  - on rocephin 1gm daily #4/5  - on doxycycline 100mg BID #4/5  - abx for superimposed bacterial coverage  - on IV solumedrol #4  - on full dose ac for DVT/possible PE   - monitor temps, leukocytosis prob d/t steroids f/u cbc  - tolerating abx well so far; no side effects noted  - reason for abx use and side effects reviewed with patient  - supportive care    2. other issues - care per medicine

## 2020-04-24 NOTE — PROGRESS NOTE ADULT - SUBJECTIVE AND OBJECTIVE BOX
Chief Complaint: cough     Subjective abd objective   48 y/o M with no significant PMH, p/w cough, runny nose and sore throat for 5 day.  NP fluent in [place]onian, assessed pt. in bed. Pt. with sepsis and Acute hypoxic respiratory failure 2/2 COVID 19 viral pneumonia  elevated D-Dimer and inflammatory markers, teated with Abx, plaquenil and steroids, symptoms improved.  Pt. is COVID-19 positive, ICD-10 is U07.1.   Pt. has acute diagnossi of COVID-19 and requires home O2 to increase hospital capacity and mitigate risk.     REVIEW OF SYSTEMS:    All other review of systems is negative unless indicated above    Vital Signs Last 24 Hrs  T(C): 36.6 (24 Apr 2020 04:27), Max: 36.6 (24 Apr 2020 04:27)  T(F): 97.9 (24 Apr 2020 04:27), Max: 97.9 (24 Apr 2020 04:27)  HR: 70 (24 Apr 2020 04:27) (58 - 70)  BP: 143/90 (24 Apr 2020 04:27) (136/89 - 143/90)  BP(mean): --  RR: 20 (24 Apr 2020 04:27) (20 - 20)  SpO2: 90% (24 Apr 2020 04:27) (90% - 94%)    I&O's Summary    23 Apr 2020 07:01  -  24 Apr 2020 07:00  --------------------------------------------------------  IN: 0 mL / OUT: 1600 mL / NET: -1600 mL      PHYSICAL EXAM:    Constitutional: NAD, awake and alert, well-developed  HEENT: PERR, EOMI, Normal Hearing, MMM  Neck: Soft and supple, No LAD, No JVD  Respiratory: Breath sounds are reduced bilaterally, No wheezing, rales or rhonchi  Cardiovascular: S1 and S2, regular rate and rhythm, no Murmurs, gallops or rubs  Gastrointestinal: Bowel Sounds present, soft, nontender, nondistended, no guarding, no rebound  Extremities: No peripheral edema  Vascular: 2+ peripheral pulses  Neurological: A/O x 3, no focal deficits  Musculoskeletal: 5/5 strength b/l upper and lower extremities  Skin: No rashes    Medications:  MEDICATIONS  (STANDING):  cefTRIAXone Injectable.      cefTRIAXone Injectable. 1000 milliGRAM(s) IV Push every 24 hours  doxycycline hyclate Capsule 100 milliGRAM(s) Oral every 12 hours  folic acid 1 milliGRAM(s) Oral daily  guaiFENesin ER 1200 milliGRAM(s) Oral every 12 hours  hydroxychloroquine 400 milliGRAM(s) Oral every 24 hours  hydroxychloroquine   Oral   multivitamin 1 Tablet(s) Oral daily  pantoprazole    Tablet 40 milliGRAM(s) Oral before breakfast  rivaroxaban 15 milliGRAM(s) Oral two times a day with meals  thiamine 100 milliGRAM(s) Oral daily      Labs: All Labs Reviewed:                        13.5   11.75 )-----------( 327      ( 24 Apr 2020 07:26 )             39.0     04-24    134<L>  |  101  |  19  ----------------------------<  95  4.1   |  27  |  0.82    Ca    8.2<L>      24 Apr 2020 07:26    TPro  6.9  /  Alb  2.4<L>  /  TBili  0.4  /  DBili  x   /  AST  62<H>  /  ALT  198<H>  /  AlkPhos  81  04-23      CARDIAC MARKERS ( 22 Apr 2020 08:53 )  0.233 ng/mL / x     / 295 U/L / x     / x          Blood Culture: 04-21 @ 01:12  Organism --  Gram Stain Blood -- Gram Stain --  Specimen Source .Blood None  Culture-Blood --    No growth to date.    Urine Culture: Organism: --  gram stain: --  04-21 @ 01:12     No growth to date.      RADIOLOGY/EKG: no new tests       DVT PPX: xarelto     Advance Directive: full code    Disposition: d/c home with home O2 Chief Complaint: cough     Subjective abd objective   48 y/o M with no significant PMH, p/w cough, runny nose and sore throat for 5 day.  NP fluent in Georgian, assessed pt. in bed. Pt. with sepsis and Acute hypoxic respiratory failure 2/2 COVID 19 viral pneumonia  elevated D-Dimer and inflammatory markers, teated with Abx, plaquenil and steroids, symptoms improved.  Pt. is COVID-19 positive, ICD-10 is U07.1.   Pt. has acute diagnossi of COVID-19 and requires home O2 to increase hospital capacity and mitigate risk.     4/21 - chest pain free, on NSM, will trend trops, TTE,  CTA -negative, however D-Dimer tending up -B/L LE US - B/L DVT, started on treatment dose lovenox  started on Abx - IV Rocephin and doxy po for superrimposed GNR PNA; ID consult, Cardiac consult for elevated trops  4/22 - AC switched to xarelto treatment dose, pt. improving. off NRM, on 4L NC  4/23 - d/c steroids, d/c tele as per cardiology, d/c planning home with O2 tomorrow   4/24: worse hypoxemia, D-Dimer on the rise, ID following - not a candidate for Anakinra at this moment,   ID will follow the pt. over the weekend       REVIEW OF SYSTEMS:    All other review of systems is negative unless indicated above    Vital Signs Last 24 Hrs  T(C): 36.6 (24 Apr 2020 04:27), Max: 36.6 (24 Apr 2020 04:27)  T(F): 97.9 (24 Apr 2020 04:27), Max: 97.9 (24 Apr 2020 04:27)  HR: 70 (24 Apr 2020 04:27) (58 - 70)  BP: 143/90 (24 Apr 2020 04:27) (136/89 - 143/90)  BP(mean): --  RR: 20 (24 Apr 2020 04:27) (20 - 20)  SpO2: 90% (24 Apr 2020 04:27) (90% - 94%)    I&O's Summary    23 Apr 2020 07:01  -  24 Apr 2020 07:00  --------------------------------------------------------  IN: 0 mL / OUT: 1600 mL / NET: -1600 mL      PHYSICAL EXAM:    Constitutional: NAD, awake and alert, well-developed  HEENT: PERR, EOMI, Normal Hearing, MMM  Neck: Soft and supple, No LAD, No JVD  Respiratory: Breath sounds are reduced bilaterally, No wheezing, rales or rhonchi  Cardiovascular: S1 and S2, regular rate and rhythm, no Murmurs, gallops or rubs  Gastrointestinal: Bowel Sounds present, soft, nontender, nondistended, no guarding, no rebound  Extremities: No peripheral edema  Vascular: 2+ peripheral pulses  Neurological: A/O x 3, no focal deficits  Musculoskeletal: 5/5 strength b/l upper and lower extremities  Skin: No rashes    Medications:  MEDICATIONS  (STANDING):  cefTRIAXone Injectable.      cefTRIAXone Injectable. 1000 milliGRAM(s) IV Push every 24 hours  doxycycline hyclate Capsule 100 milliGRAM(s) Oral every 12 hours  folic acid 1 milliGRAM(s) Oral daily  guaiFENesin ER 1200 milliGRAM(s) Oral every 12 hours  hydroxychloroquine 400 milliGRAM(s) Oral every 24 hours  hydroxychloroquine   Oral   multivitamin 1 Tablet(s) Oral daily  pantoprazole    Tablet 40 milliGRAM(s) Oral before breakfast  rivaroxaban 15 milliGRAM(s) Oral two times a day with meals  thiamine 100 milliGRAM(s) Oral daily      Labs: All Labs Reviewed:                        13.5   11.75 )-----------( 327      ( 24 Apr 2020 07:26 )             39.0     04-24    134<L>  |  101  |  19  ----------------------------<  95  4.1   |  27  |  0.82    Ca    8.2<L>      24 Apr 2020 07:26    TPro  6.9  /  Alb  2.4<L>  /  TBili  0.4  /  DBili  x   /  AST  62<H>  /  ALT  198<H>  /  AlkPhos  81  04-23      CARDIAC MARKERS ( 22 Apr 2020 08:53 )  0.233 ng/mL / x     / 295 U/L / x     / x          Blood Culture: 04-21 @ 01:12  Organism --  Gram Stain Blood -- Gram Stain --  Specimen Source .Blood None  Culture-Blood --    No growth to date.    Urine Culture: Organism: --  gram stain: --  04-21 @ 01:12     No growth to date.      RADIOLOGY/EKG: no new tests       DVT PPX: xarelto     Advance Directive: full code    Disposition: monitor respiratory status

## 2020-04-25 LAB
ALBUMIN SERPL ELPH-MCNC: 2.4 G/DL — LOW (ref 3.3–5)
ALP SERPL-CCNC: 67 U/L — SIGNIFICANT CHANGE UP (ref 40–120)
ALT FLD-CCNC: 132 U/L — HIGH (ref 12–78)
ANION GAP SERPL CALC-SCNC: 6 MMOL/L — SIGNIFICANT CHANGE UP (ref 5–17)
AST SERPL-CCNC: 41 U/L — HIGH (ref 15–37)
BILIRUB SERPL-MCNC: 0.5 MG/DL — SIGNIFICANT CHANGE UP (ref 0.2–1.2)
BUN SERPL-MCNC: 16 MG/DL — SIGNIFICANT CHANGE UP (ref 7–23)
CALCIUM SERPL-MCNC: 8.2 MG/DL — LOW (ref 8.5–10.1)
CHLORIDE SERPL-SCNC: 103 MMOL/L — SIGNIFICANT CHANGE UP (ref 96–108)
CO2 SERPL-SCNC: 26 MMOL/L — SIGNIFICANT CHANGE UP (ref 22–31)
CREAT SERPL-MCNC: 0.76 MG/DL — SIGNIFICANT CHANGE UP (ref 0.5–1.3)
CRP SERPL-MCNC: 6.47 MG/DL — HIGH (ref 0–0.4)
D DIMER BLD IA.RAPID-MCNC: HIGH NG/ML DDU
FERRITIN SERPL-MCNC: 805 NG/ML — HIGH (ref 30–400)
GLUCOSE SERPL-MCNC: 99 MG/DL — SIGNIFICANT CHANGE UP (ref 70–99)
HCT VFR BLD CALC: 39.1 % — SIGNIFICANT CHANGE UP (ref 39–50)
HGB BLD-MCNC: 13.6 G/DL — SIGNIFICANT CHANGE UP (ref 13–17)
MCHC RBC-ENTMCNC: 29 PG — SIGNIFICANT CHANGE UP (ref 27–34)
MCHC RBC-ENTMCNC: 34.8 GM/DL — SIGNIFICANT CHANGE UP (ref 32–36)
MCV RBC AUTO: 83.4 FL — SIGNIFICANT CHANGE UP (ref 80–100)
PLATELET # BLD AUTO: 276 K/UL — SIGNIFICANT CHANGE UP (ref 150–400)
POTASSIUM SERPL-MCNC: 4 MMOL/L — SIGNIFICANT CHANGE UP (ref 3.5–5.3)
POTASSIUM SERPL-SCNC: 4 MMOL/L — SIGNIFICANT CHANGE UP (ref 3.5–5.3)
PROCALCITONIN SERPL-MCNC: 0.06 NG/ML — SIGNIFICANT CHANGE UP (ref 0.02–0.1)
PROT SERPL-MCNC: 6.6 GM/DL — SIGNIFICANT CHANGE UP (ref 6–8.3)
RBC # BLD: 4.69 M/UL — SIGNIFICANT CHANGE UP (ref 4.2–5.8)
RBC # FLD: 12 % — SIGNIFICANT CHANGE UP (ref 10.3–14.5)
SODIUM SERPL-SCNC: 135 MMOL/L — SIGNIFICANT CHANGE UP (ref 135–145)
WBC # BLD: 10.13 K/UL — SIGNIFICANT CHANGE UP (ref 3.8–10.5)
WBC # FLD AUTO: 10.13 K/UL — SIGNIFICANT CHANGE UP (ref 3.8–10.5)

## 2020-04-25 PROCEDURE — 99233 SBSQ HOSP IP/OBS HIGH 50: CPT

## 2020-04-25 RX ADMIN — Medication 1200 MILLIGRAM(S): at 04:49

## 2020-04-25 RX ADMIN — Medication 100 MILLIGRAM(S): at 04:50

## 2020-04-25 RX ADMIN — PANTOPRAZOLE SODIUM 40 MILLIGRAM(S): 20 TABLET, DELAYED RELEASE ORAL at 04:49

## 2020-04-25 RX ADMIN — Medication 1200 MILLIGRAM(S): at 17:06

## 2020-04-25 RX ADMIN — Medication 100 MILLIGRAM(S): at 17:06

## 2020-04-25 RX ADMIN — RIVAROXABAN 15 MILLIGRAM(S): KIT at 10:40

## 2020-04-25 RX ADMIN — Medication 400 MILLIGRAM(S): at 04:48

## 2020-04-25 RX ADMIN — CEFTRIAXONE 1000 MILLIGRAM(S): 500 INJECTION, POWDER, FOR SOLUTION INTRAMUSCULAR; INTRAVENOUS at 10:40

## 2020-04-25 RX ADMIN — Medication 100 MILLIGRAM(S): at 10:40

## 2020-04-25 RX ADMIN — RIVAROXABAN 15 MILLIGRAM(S): KIT at 17:06

## 2020-04-25 RX ADMIN — Medication 1 MILLIGRAM(S): at 10:39

## 2020-04-25 RX ADMIN — Medication 1 TABLET(S): at 10:38

## 2020-04-25 NOTE — PROGRESS NOTE ADULT - ASSESSMENT
50 y/o M with no significant PMH, p/w cough, runny nose and sore throat for 5 day.  NP fluent in [place], assessed pt. in bed. Pt. with sepsis and Acute hypoxic respiratory failure 2/2 COVID 19 viral pneumonia  elevated D-Dimer and inflammatory markers, teated with Abx, plaquenil and steroids, symptoms improved.  Pt. is COVID-19 positive, ICD-10 is U07.1.   Pt. has acute diagnosis of COVID-19 and requires home O2 to increase hospital capacity and mitigate risk.     # Sepsis and Acute hypoxic respiratory failure 2/2 COVID 19 viral pneumonia  elevated D-Dimer and inflammatory markers  respiratory status improved - off NRM - on 4 L NC with O2 Sat wnl   on plaquenil #3/5 off label use, monitor qtc closely  - on rocephin 1gm daily #4/5  - on doxycycline 100mg BID #4/5  - abx for superimposed bacterial coverage  - on full dose ac for DVT/possible PE   - monitor temps, leukocytosis prob 2/2 steroids which is improving   - d/c steroids   - ID consult appreciated - not a candidate for Anakinra yet, will monitor over the weekend     # Elevated D-dimer -  264->[id number] ->75898->6579->7920  CTA suboptimal, no central PE, however segmental and subsegmental branches not assessed   - B/L LE US  shows B/L LE DVT   - cont. xarelto 15 mg po bid  for a total of 42 doses   - cont.  PPI for GI prophylaxis   - trend D-Dimer     # Elevated troponins and CPK - trending down   trop: 0.047->0.084->0.379->0.233  CPK: 674->796  suspect 2/2 demand ischemia in the setting of viral infection with COVID-19  - trend trops and EKG  - f/u TTE  - Cardiology consult appreciated: No active cardiac issues at this time , would DC tele     # Chest pain with elevated trops and CPK  - at present chest pain free   Trops trending down  TTE - wnl with EF 55%  Cardiology consult appreciated: likely demand from COVID, medical management,. d/c tele      # Hyponatremia - Ts=904  resolved   monitor Na, BMP in AM      # Leukocytosis/hyperglycemia - resolved   2/2 steroid use  - monitor WBCm CBC in AM    # Transaminitis - liver enzymes trending down   suspect 2/2 viral infection with COVID-19, Hx of ETOH and Abx use  - abdominal US shows steatosis most likely due to ETOH  - hepatitis panel - negative  - pt. will be counseled regarding cutting drinking     # ETOH - last drink 10 days ago , as per pt.  Alcohol blood <10  -Patient states he drinks 3-4 drinks per day  -No signs of withdrawal at this time, will monitor   -MVI / thiamine / folate

## 2020-04-25 NOTE — PROGRESS NOTE ADULT - SUBJECTIVE AND OBJECTIVE BOX
Chief Complaint: cough    Subjective and objective   48 y/o M with no significant PMH, p/w cough, runny nose and sore throat for 5 day.  NP fluent in [place], assessed pt. in bed. Pt. with sepsis and Acute hypoxic respiratory failure 2/2 COVID 19 viral pneumonia  elevated D-Dimer and inflammatory markers, teated with Abx, plaquenil and steroids, symptoms improved.  Pt. is COVID-19 positive, ICD-10 is U07.1.   Pt. has acute diagnossi of COVID-19 and requires home O2 to increase hospital capacity and mitigate risk.     4/21 - chest pain free, on NSM, will trend trops, TTE,  CTA -negative, however D-Dimer tending up -B/L LE US - B/L DVT, started on treatment dose lovenox  started on Abx - IV Rocephin and doxy po for superrimposed GNR PNA; ID consult, Cardiac consult for elevated trops  4/22 - AC switched to xarelto treatment dose, pt. improving. off NRM, on 4L NC  4/23 - d/c steroids, d/c tele as per cardiology, d/c planning home with O2 tomorrow   4/24: worse hypoxemia, D-Dimer on the rise, ID following - not a candidate for Anakinra at this moment,   ID will follow the pt. over the weekend   4/25: on NC - O2 Sat improving    REVIEW OF SYSTEMS:    CONSTITUTIONAL: No weakness, fevers or chills  EYES/ENT: No visual changes;  No vertigo or throat pain   NECK: No pain or stiffness  RESPIRATORY: No cough, wheezing, hemoptysis; No shortness of breath  CARDIOVASCULAR: No chest pain or palpitations  GASTROINTESTINAL: No abdominal or epigastric pain. No nausea, vomiting, or hematemesis; No diarrhea or constipation. No melena or hematochezia.  GENITOURINARY: No dysuria, frequency or hematuria  NEUROLOGICAL: No numbness or weakness  SKIN: No itching, burning, rashes, or lesions   All other review of systems is negative unless indicated above    Vital Signs Last 24 Hrs  T(C): 37.2 (25 Apr 2020 10:45), Max: 37.6 (24 Apr 2020 20:55)  T(F): 99 (25 Apr 2020 10:45), Max: 99.6 (24 Apr 2020 20:55)  HR: 90 (25 Apr 2020 10:45) (90 - 96)  BP: 101/57 (25 Apr 2020 10:45) (101/57 - 120/71)  BP(mean): --  RR: 20 (25 Apr 2020 10:45) (20 - 20)  SpO2: 94% (25 Apr 2020 10:45) (89% - 94%)    I&O's Summary    24 Apr 2020 07:01  -  25 Apr 2020 07:00  --------------------------------------------------------  IN: 0 mL / OUT: 2625 mL / NET: -2625 mL        CAPILLARY BLOOD GLUCOSE      POCT Blood Glucose.: 336 mg/dL (25 Apr 2020 12:03)      PHYSICAL EXAM:    Constitutional: NAD, awake and alert, well-developed  HEENT: PERR, EOMI, Normal Hearing, MMM  Neck: Soft and supple, No LAD, No JVD  Respiratory: Breath sounds are clear bilaterally, No wheezing, rales or rhonchi  Cardiovascular: S1 and S2, regular rate and rhythm, no Murmurs, gallops or rubs  Gastrointestinal: Bowel Sounds present, soft, nontender, nondistended, no guarding, no rebound  Extremities: No peripheral edema  Vascular: 2+ peripheral pulses  Neurological: A/O x 3, no focal deficits  Musculoskeletal: 5/5 strength b/l upper and lower extremities  Skin: No rashes    Medications:  MEDICATIONS  (STANDING):  cefTRIAXone Injectable.      cefTRIAXone Injectable. 1000 milliGRAM(s) IV Push every 24 hours  doxycycline hyclate Capsule 100 milliGRAM(s) Oral every 12 hours  folic acid 1 milliGRAM(s) Oral daily  guaiFENesin ER 1200 milliGRAM(s) Oral every 12 hours  multivitamin 1 Tablet(s) Oral daily  pantoprazole    Tablet 40 milliGRAM(s) Oral before breakfast  rivaroxaban 15 milliGRAM(s) Oral two times a day with meals  thiamine 100 milliGRAM(s) Oral daily      Labs: All Labs Reviewed:                        13.6   10.13 )-----------( 276      ( 25 Apr 2020 07:17 )             39.1     04-25    135  |  103  |  16  ----------------------------<  99  4.0   |  26  |  0.76    Ca    8.2<L>      25 Apr 2020 07:17    TPro  6.6  /  Alb  2.4<L>  /  TBili  0.5  /  DBili  x   /  AST  41<H>  /  ALT  132<H>  /  AlkPhos  67  04-25      Blood Culture: 04-21 @ 01:12  Organism --  Gram Stain Blood -- Gram Stain --  Specimen Source .Blood None  Culture-Blood --    No growth to date.    Urine Culture: Organism: --  gram stain: --  04-21 @ 01:12     No growth to date.      RADIOLOGY/EKG: no new tests       DVT PPX: xarelto     Advance Directive: full code    Disposition: d/c planning home with home O2

## 2020-04-26 LAB
ANION GAP SERPL CALC-SCNC: 6 MMOL/L — SIGNIFICANT CHANGE UP (ref 5–17)
BUN SERPL-MCNC: 14 MG/DL — SIGNIFICANT CHANGE UP (ref 7–23)
CALCIUM SERPL-MCNC: 8.1 MG/DL — LOW (ref 8.5–10.1)
CHLORIDE SERPL-SCNC: 103 MMOL/L — SIGNIFICANT CHANGE UP (ref 96–108)
CO2 SERPL-SCNC: 25 MMOL/L — SIGNIFICANT CHANGE UP (ref 22–31)
CREAT SERPL-MCNC: 0.8 MG/DL — SIGNIFICANT CHANGE UP (ref 0.5–1.3)
CULTURE RESULTS: SIGNIFICANT CHANGE UP
D DIMER BLD IA.RAPID-MCNC: HIGH NG/ML DDU
GLUCOSE SERPL-MCNC: 92 MG/DL — SIGNIFICANT CHANGE UP (ref 70–99)
HCT VFR BLD CALC: 40.3 % — SIGNIFICANT CHANGE UP (ref 39–50)
HGB BLD-MCNC: 13.8 G/DL — SIGNIFICANT CHANGE UP (ref 13–17)
MCHC RBC-ENTMCNC: 29.5 PG — SIGNIFICANT CHANGE UP (ref 27–34)
MCHC RBC-ENTMCNC: 34.2 GM/DL — SIGNIFICANT CHANGE UP (ref 32–36)
MCV RBC AUTO: 86.1 FL — SIGNIFICANT CHANGE UP (ref 80–100)
PLATELET # BLD AUTO: 283 K/UL — SIGNIFICANT CHANGE UP (ref 150–400)
POTASSIUM SERPL-MCNC: 3.9 MMOL/L — SIGNIFICANT CHANGE UP (ref 3.5–5.3)
POTASSIUM SERPL-SCNC: 3.9 MMOL/L — SIGNIFICANT CHANGE UP (ref 3.5–5.3)
RBC # BLD: 4.68 M/UL — SIGNIFICANT CHANGE UP (ref 4.2–5.8)
RBC # FLD: 12.2 % — SIGNIFICANT CHANGE UP (ref 10.3–14.5)
SODIUM SERPL-SCNC: 134 MMOL/L — LOW (ref 135–145)
SPECIMEN SOURCE: SIGNIFICANT CHANGE UP
WBC # BLD: 9.58 K/UL — SIGNIFICANT CHANGE UP (ref 3.8–10.5)
WBC # FLD AUTO: 9.58 K/UL — SIGNIFICANT CHANGE UP (ref 3.8–10.5)

## 2020-04-26 PROCEDURE — 99233 SBSQ HOSP IP/OBS HIGH 50: CPT

## 2020-04-26 RX ADMIN — Medication 100 MILLIGRAM(S): at 08:45

## 2020-04-26 RX ADMIN — Medication 1200 MILLIGRAM(S): at 17:12

## 2020-04-26 RX ADMIN — CEFTRIAXONE 1000 MILLIGRAM(S): 500 INJECTION, POWDER, FOR SOLUTION INTRAMUSCULAR; INTRAVENOUS at 08:44

## 2020-04-26 RX ADMIN — RIVAROXABAN 15 MILLIGRAM(S): KIT at 17:13

## 2020-04-26 RX ADMIN — Medication 1200 MILLIGRAM(S): at 05:12

## 2020-04-26 RX ADMIN — Medication 100 MILLIGRAM(S): at 05:12

## 2020-04-26 RX ADMIN — RIVAROXABAN 15 MILLIGRAM(S): KIT at 08:44

## 2020-04-26 RX ADMIN — Medication 1 MILLIGRAM(S): at 08:45

## 2020-04-26 RX ADMIN — Medication 1 TABLET(S): at 08:44

## 2020-04-26 RX ADMIN — PANTOPRAZOLE SODIUM 40 MILLIGRAM(S): 20 TABLET, DELAYED RELEASE ORAL at 05:12

## 2020-04-26 NOTE — PROGRESS NOTE ADULT - ASSESSMENT
48 y/o M with no significant PMH, p/w cough, runny nose and sore throat for 5 day.  NP fluent in Sw[place], assessed pt. in bed. Pt. with sepsis and Acute hypoxic respiratory failure 2/2 COVID 19 viral pneumonia  elevated D-Dimer and inflammatory markers, teated with Abx, plaquenil and steroids, symptoms improved.  Pt. is COVID-19 positive, ICD-10 is U07.1.   Pt. has acute diagnosis of COVID-19 and requires home O2 to increase hospital capacity and mitigate risk.     # Sepsis and Acute hypoxic respiratory failure 2/2 COVID 19 viral pneumonia  elevated D-Dimer and inflammatory markers  respiratory status improved - off NRM - on 3 L NC with O2 Sat wnl   on plaquenil #3/5 off label use, monitor qtc closely  - finished course of plaquenil and Abx with improvement in symptoms   - on full dose ac for DVT/possible PE   - ID consult appreciated   Pt. is stable for d/c home, awaiting for O2 to be delivered home -   NP spoke with  - awaiting for insurance auth    # Elevated D-dimer -  264->01704 ->62807->6579->7920  CTA suboptimal, no central PE, however segmental and subsegmental branches not assessed   - B/L LE US  shows B/L LE DVT   - cont. xarelto 15 mg po bid  for a total of 42 doses   - cont.  PPI for GI prophylaxis     # Elevated troponins and CPK - trending down   trop: 0.047->0.084->0.379->0.233  CPK: 674->796  suspect 2/2 demand ischemia in the setting of viral infection with COVID-19  - trend trops and EKG  - f/u TTE  - Cardiology consult appreciated: No active cardiac issues at this time , would DC tele     # Chest pain with elevated trops and CPK  - at present chest pain free   Trops trending down  TTE - wnl with EF 55%  Cardiology consult appreciated: likely demand from COVID, medical management,. d/c tele      # Hyponatremia - Mw=086  resolved   monitor Na, BMP in AM      # Leukocytosis/hyperglycemia - resolved   2/2 steroid use  - monitor WBC    # Transaminitis - liver enzymes trending down   suspect 2/2 viral infection with COVID-19, Hx of ETOH and Abx use  - abdominal US shows steatosis most likely due to ETOH  - hepatitis panel - negative  - pt. will be counseled regarding cutting drinking     # ETOH - last drink 10 days ago , as per pt.  Alcohol blood <10  -Patient states he drinks 3-4 drinks per day  -No signs of withdrawal at this time, will monitor   -MVI / thiamine / folate

## 2020-04-26 NOTE — PROGRESS NOTE ADULT - SUBJECTIVE AND OBJECTIVE BOX
Chief Complaint: cough     Subjective and objective   50 y/o M with no significant PMH, p/w cough, runny nose and sore throat for 5 day.  NP fluent in [place], assessed pt. in bed. Pt. with sepsis and Acute hypoxic respiratory failure 2/2 COVID 19 viral pneumonia  elevated D-Dimer and inflammatory markers, teated with Abx, plaquenil and steroids, symptoms improved.  Pt. is COVID-19 positive, ICD-10 is U07.1.   Pt. has acute diagnosis  of COVID-19 and requires home O2 to increase hospital capacity and mitigate risk.     4/21 - chest pain free, on NSM, will trend trops, TTE,  CTA -negative, however D-Dimer tending up -B/L LE US - B/L DVT, started on treatment dose lovenox  started on Abx - IV Rocephin and doxy po for superrimposed GNR PNA; ID consult, Cardiac consult for elevated trops  4/22 - AC switched to xarelto treatment dose, pt. improving. off NRM, on 4L NC  4/23 - d/c steroids, d/c tele as per cardiology, d/c planning home with O2 tomorrow   4/24: worse hypoxemia, D-Dimer on the rise, ID following - not a candidate for Anakinra at this moment,   ID will follow the pt. over the weekend   4/25: on NC - O2 Sat improving  4/26: on 3L NC, stable, awaiting for O2 to be delivered home, d/c Mon    REVIEW OF SYSTEMS:    All other review of systems is negative unless indicated above    Vital Signs Last 24 Hrs  T(C): 36.9 (26 Apr 2020 04:56), Max: 37.1 (25 Apr 2020 20:31)  T(F): 98.5 (26 Apr 2020 04:56), Max: 98.7 (25 Apr 2020 20:31)  HR: 88 (26 Apr 2020 04:56) (87 - 88)  BP: 115/64 (26 Apr 2020 04:56) (108/60 - 115/64)  BP(mean): --  RR: 20 (25 Apr 2020 20:31) (20 - 20)  SpO2: 92% (26 Apr 2020 04:56) (92% - 96%)    I&O's Summary    25 Apr 2020 07:01 - 26 Apr 2020 07:00  --------------------------------------------------------  IN: 0 mL / OUT: 4600 mL / NET: -4600 mL        CAPILLARY BLOOD GLUCOSE      POCT Blood Glucose.: 336 mg/dL (25 Apr 2020 12:03)      PHYSICAL EXAM:    Constitutional: NAD, awake and alert, well-developed  HEENT: PERR, EOMI, Normal Hearing, MMM  Neck: Soft and supple, No LAD, No JVD  Respiratory: Breath sounds are clear bilaterally, No wheezing, rales or rhonchi  Cardiovascular: S1 and S2, regular rate and rhythm, no Murmurs, gallops or rubs  Gastrointestinal: Bowel Sounds present, soft, nontender, nondistended, no guarding, no rebound  Extremities: No peripheral edema  Vascular: 2+ peripheral pulses  Neurological: A/O x 3, no focal deficits  Musculoskeletal: 5/5 strength b/l upper and lower extremities  Skin: No rashes    Medications:  MEDICATIONS  (STANDING):  doxycycline hyclate Capsule 100 milliGRAM(s) Oral every 12 hours  folic acid 1 milliGRAM(s) Oral daily  guaiFENesin ER 1200 milliGRAM(s) Oral every 12 hours  multivitamin 1 Tablet(s) Oral daily  pantoprazole    Tablet 40 milliGRAM(s) Oral before breakfast  rivaroxaban 15 milliGRAM(s) Oral two times a day with meals  thiamine 100 milliGRAM(s) Oral daily      Labs: All Labs Reviewed:                        13.8   9.58  )-----------( 283      ( 26 Apr 2020 06:55 )             40.3     04-26    134<L>  |  103  |  14  ----------------------------<  92  3.9   |  25  |  0.80    Ca    8.1<L>      26 Apr 2020 06:55    TPro  6.6  /  Alb  2.4<L>  /  TBili  0.5  /  DBili  x   /  AST  41<H>  /  ALT  132<H>  /  AlkPhos  67  04-25      Blood Culture: 04-21 @ 01:12  Organism --  Gram Stain Blood -- Gram Stain --  Specimen Source .Blood None  Culture-Blood --    No Growth Final    Urine Culture: Organism: --  gram stain: --  04-21 @ 01:12     No Growth Final      RADIOLOGY/EKG: no new tests       DVT PPX: on xarelto     Advance Directive: full code    Disposition: d/c home when O2 delivered

## 2020-04-27 ENCOUNTER — TRANSCRIPTION ENCOUNTER (OUTPATIENT)
Age: 50
End: 2020-04-27

## 2020-04-27 PROCEDURE — 99232 SBSQ HOSP IP/OBS MODERATE 35: CPT

## 2020-04-27 RX ADMIN — Medication 100 MILLIGRAM(S): at 09:12

## 2020-04-27 RX ADMIN — RIVAROXABAN 15 MILLIGRAM(S): KIT at 17:06

## 2020-04-27 RX ADMIN — Medication 1 TABLET(S): at 09:11

## 2020-04-27 RX ADMIN — PANTOPRAZOLE SODIUM 40 MILLIGRAM(S): 20 TABLET, DELAYED RELEASE ORAL at 04:57

## 2020-04-27 RX ADMIN — RIVAROXABAN 15 MILLIGRAM(S): KIT at 09:12

## 2020-04-27 RX ADMIN — Medication 1 MILLIGRAM(S): at 09:12

## 2020-04-27 RX ADMIN — Medication 1200 MILLIGRAM(S): at 17:06

## 2020-04-27 RX ADMIN — Medication 1200 MILLIGRAM(S): at 04:57

## 2020-04-27 NOTE — DISCHARGE NOTE NURSING/CASE MANAGEMENT/SOCIAL WORK - NSDCFUADDAPPT_GEN_ALL_CORE_FT
[facility]  [address], [place] [address]  Phone: [phone]  TUESDAY, MAY 5TH, 2020 at 2:40 PM with GLORIA LEMA

## 2020-04-27 NOTE — PROGRESS NOTE ADULT - ASSESSMENT
48 y/o M with no significant PMH, p/w cough, runny nose and sore throat for 5 day.  NP fluent in [place], assessed pt. in bed. Pt. with sepsis and Acute hypoxic respiratory failure 2/2 COVID 19 viral pneumonia  elevated D-Dimer and inflammatory markers, teated with Abx, plaquenil and steroids, symptoms improved.  Pt. is COVID-19 positive, ICD-10 is U07.1.   Pt. has acute diagnosis of COVID-19 and requires home O2 to increase hospital capacity and mitigate risk.     # Sepsis and Acute hypoxic respiratory failure 2/2 COVID 19 viral pneumonia  elevated D-Dimer and inflammatory markers  respiratory status improved - off NRM - on 3 L NC with O2 Sat wnl   on plaquenil #3/5 off label use, monitor qtc closely  - finished course of plaquenil and Abx with improvement in symptoms   - on full dose ac for DVT/possible PE   - ID consult appreciated   Pt. is stable for d/c home, awaiting for O2 to be delivered home -     # Elevated D-dimer -  264->57388 ->73441->6579->7920  CTA suboptimal, no central PE, however segmental and subsegmental branches not assessed   - B/L LE US  shows B/L LE DVT   - cont. xarelto 15 mg po bid  for a total of 42 doses   - cont.  PPI for GI prophylaxis     # Elevated troponins and CPK - trending down   trop: 0.047->0.084->0.379->0.233  CPK: 674->796  suspect 2/2 demand ischemia in the setting of viral infection with COVID-19  - trend trops and EKG  - f/u TTE  - Cardiology consult appreciated: No active cardiac issues at this time , would DC tele     # Chest pain with elevated trops and CPK  - at present chest pain free   Trops trending down  TTE - wnl with EF 55%  Cardiology consult appreciated: likely demand from COVID, medical management,. d/c tele      # Hyponatremia - improved  monitor Na, BMP in AM      # Leukocytosis/hyperglycemia - resolved   2/2 steroid use  - monitor WBC    # Transaminitis - liver enzymes trending down   suspect 2/2 viral infection with COVID-19, Hx of ETOH and Abx use  - abdominal US shows steatosis most likely due to ETOH  - hepatitis panel - negative  - pt. will be counseled regarding cutting drinking     # ETOH - last drink 10 days ago , as per pt.  Alcohol blood <10  -Patient states he drinks 3-4 drinks per day  -No signs of withdrawal at this time, will monitor   -MVI / thiamine / folate

## 2020-04-27 NOTE — PROGRESS NOTE ADULT - SUBJECTIVE AND OBJECTIVE BOX
Chief Complaint: cough     Subjective and objective   50 y/o M with no significant PMH, p/w cough, runny nose and sore throat for 5 day.  NP fluent in Syriac, assessed pt. in bed. Pt. with sepsis and Acute hypoxic respiratory failure 2/2 COVID 19 viral pneumonia  elevated D-Dimer and inflammatory markers, teated with Abx, plaquenil and steroids, symptoms improved.  Pt. is COVID-19 positive, ICD-10 is U07.1.   Pt. has acute diagnosis  of COVID-19 and requires home O2 to increase hospital capacity and mitigate risk.     4/21 - chest pain free, on NSM, will trend trops, TTE,  CTA -negative, however D-Dimer tending up -B/L LE US - B/L DVT, started on treatment dose lovenox  started on Abx - IV Rocephin and doxy po for superrimposed GNR PNA; ID consult, Cardiac consult for elevated trops  4/22 - AC switched to xarelto treatment dose, pt. improving. off NRM, on 4L NC  4/23 - d/c steroids, d/c tele as per cardiology, d/c planning home with O2 tomorrow   4/24: worse hypoxemia, D-Dimer on the rise, ID following - not a candidate for Anakinra at this moment,   ID will follow the pt. over the weekend   4/25: on NC - O2 Sat improving  4/26: on 3L NC, stable, awaiting for O2 to be delivered home, d/c Mon    REVIEW OF SYSTEMS:    All other review of systems is negative unless indicated above    Vital Signs Last 24 Hrs  T(C): 37.1 (27 Apr 2020 04:18), Max: 37.1 (27 Apr 2020 04:18)  T(F): 98.8 (27 Apr 2020 04:18), Max: 98.8 (27 Apr 2020 04:18)  HR: 75 (27 Apr 2020 04:18) (75 - 78)  BP: 104/63 (27 Apr 2020 04:18) (104/63 - 112/53)  BP(mean): --  RR: 20 (26 Apr 2020 20:54) (20 - 20)  SpO2: 97% (27 Apr 2020 04:18) (96% - 97%)    I&O's Summary    26 Apr 2020 07:01 - 27 Apr 2020 07:00  --------------------------------------------------------  IN: 0 mL / OUT: 1300 mL / NET: -1300 mL    27 Apr 2020 07:01  -  27 Apr 2020 11:15  --------------------------------------------------------  IN: 0 mL / OUT: 2000 mL / NET: -2000 mL      PHYSICAL EXAM:    Constitutional: NAD, awake and alert, well-developed  HEENT: PERR, EOMI, Normal Hearing, MMM  Neck: Soft and supple, No LAD, No JVD  Respiratory: Breath sounds are decreased bilaterally, No wheezing, rales or rhonchi  Cardiovascular: S1 and S2, regular rate and rhythm, no Murmurs, gallops or rubs  Gastrointestinal: Bowel Sounds present, soft, nontender, nondistended, no guarding, no rebound  Extremities: No peripheral edema  Vascular: 2+ peripheral pulses  Neurological: A/O x 3, no focal deficits  Musculoskeletal: 5/5 strength b/l upper and lower extremities  Skin: No rashes    Medications:  MEDICATIONS  (STANDING):  folic acid 1 milliGRAM(s) Oral daily  guaiFENesin ER 1200 milliGRAM(s) Oral every 12 hours  multivitamin 1 Tablet(s) Oral daily  pantoprazole    Tablet 40 milliGRAM(s) Oral before breakfast  rivaroxaban 15 milliGRAM(s) Oral two times a day with meals  thiamine 100 milliGRAM(s) Oral daily      Labs: All Labs Reviewed:                        13.8   9.58  )-----------( 283      ( 26 Apr 2020 06:55 )             40.3     04-26    134<L>  |  103  |  14  ----------------------------<  92  3.9   |  25  |  0.80    Ca    8.1<L>      26 Apr 2020 06:55    Blood Culture: 04-21 @ 01:12  Organism --  Gram Stain Blood -- Gram Stain --  Specimen Source .Blood None  Culture-Blood --    No Growth Final    Urine Culture: Organism: --  gram stain: --  04-21 @ 01:12     No Growth Final    RADIOLOGY/EKG: no new tests       DVT PPX: xarelto     Advance Directive: full code    Disposition: d/c planning home with home O2

## 2020-04-27 NOTE — DISCHARGE NOTE NURSING/CASE MANAGEMENT/SOCIAL WORK - PATIENT PORTAL LINK FT
You can access the FollowMyHealth Patient Portal offered by [facility] by registering at the following website: [email or web address]. By joining [email or web address]’s FollowMyHealth portal, you will also be able to view your health information using other applications (apps) compatible with our system.

## 2020-04-28 VITALS
HEART RATE: 84 BPM | TEMPERATURE: 98 F | RESPIRATION RATE: 18 BRPM | OXYGEN SATURATION: 93 % | DIASTOLIC BLOOD PRESSURE: 63 MMHG | SYSTOLIC BLOOD PRESSURE: 114 MMHG

## 2020-04-28 PROCEDURE — 99239 HOSP IP/OBS DSCHRG MGMT >30: CPT

## 2020-04-28 RX ORDER — RIVAROXABAN 15 MG-20MG
1 KIT ORAL
Qty: 30 | Refills: 0
Start: 2020-04-28 | End: 2020-05-12

## 2020-04-28 RX ORDER — PANTOPRAZOLE SODIUM 20 MG/1
1 TABLET, DELAYED RELEASE ORAL
Qty: 30 | Refills: 0
Start: 2020-04-28 | End: 2020-05-27

## 2020-04-28 RX ORDER — RIVAROXABAN 15 MG-20MG
1 KIT ORAL
Qty: 0 | Refills: 0 | DISCHARGE
Start: 2020-04-28

## 2020-04-28 RX ORDER — FOLIC ACID 0.8 MG
1 TABLET ORAL
Qty: 30 | Refills: 0
Start: 2020-04-28 | End: 2020-05-27

## 2020-04-28 RX ORDER — THIAMINE MONONITRATE (VIT B1) 100 MG
1 TABLET ORAL
Qty: 30 | Refills: 0
Start: 2020-04-28 | End: 2020-05-27

## 2020-04-28 RX ADMIN — Medication 100 MILLIGRAM(S): at 11:55

## 2020-04-28 RX ADMIN — Medication 1 TABLET(S): at 11:54

## 2020-04-28 RX ADMIN — RIVAROXABAN 15 MILLIGRAM(S): KIT at 11:17

## 2020-04-28 RX ADMIN — Medication 1 MILLIGRAM(S): at 11:54

## 2020-04-28 RX ADMIN — Medication 1200 MILLIGRAM(S): at 04:48

## 2020-04-28 RX ADMIN — PANTOPRAZOLE SODIUM 40 MILLIGRAM(S): 20 TABLET, DELAYED RELEASE ORAL at 04:48

## 2020-05-01 DIAGNOSIS — A41.89 OTHER SPECIFIED SEPSIS: ICD-10-CM

## 2020-05-01 DIAGNOSIS — E87.1 HYPO-OSMOLALITY AND HYPONATREMIA: ICD-10-CM

## 2020-05-01 DIAGNOSIS — I24.8 OTHER FORMS OF ACUTE ISCHEMIC HEART DISEASE: ICD-10-CM

## 2020-05-01 DIAGNOSIS — D72.829 ELEVATED WHITE BLOOD CELL COUNT, UNSPECIFIED: ICD-10-CM

## 2020-05-01 DIAGNOSIS — T38.0X5A ADVERSE EFFECT OF GLUCOCORTICOIDS AND SYNTHETIC ANALOGUES, INITIAL ENCOUNTER: ICD-10-CM

## 2020-05-01 DIAGNOSIS — I82.443 ACUTE EMBOLISM AND THROMBOSIS OF TIBIAL VEIN, BILATERAL: ICD-10-CM

## 2020-05-01 DIAGNOSIS — K76.0 FATTY (CHANGE OF) LIVER, NOT ELSEWHERE CLASSIFIED: ICD-10-CM

## 2020-05-01 DIAGNOSIS — R74.0 NONSPECIFIC ELEVATION OF LEVELS OF TRANSAMINASE AND LACTIC ACID DEHYDROGENASE [LDH]: ICD-10-CM

## 2020-05-01 DIAGNOSIS — U07.1 COVID-19: ICD-10-CM

## 2020-05-01 DIAGNOSIS — R05 COUGH: ICD-10-CM

## 2020-05-01 DIAGNOSIS — F10.10 ALCOHOL ABUSE, UNCOMPLICATED: ICD-10-CM

## 2020-05-01 DIAGNOSIS — J96.01 ACUTE RESPIRATORY FAILURE WITH HYPOXIA: ICD-10-CM

## 2020-05-01 DIAGNOSIS — J12.89 OTHER VIRAL PNEUMONIA: ICD-10-CM

## 2020-05-01 DIAGNOSIS — R73.9 HYPERGLYCEMIA, UNSPECIFIED: ICD-10-CM

## 2020-05-14 RX ORDER — RIVAROXABAN 15 MG-20MG
1 KIT ORAL
Qty: 30 | Refills: 0
Start: 2020-05-14 | End: 2020-06-12

## 2021-06-23 ENCOUNTER — TRANSCRIPTION ENCOUNTER (OUTPATIENT)
Age: 51
End: 2021-06-23

## 2022-01-25 ENCOUNTER — EMERGENCY (EMERGENCY)
Facility: HOSPITAL | Age: 52
LOS: 0 days | Discharge: HOME | End: 2022-01-26
Attending: EMERGENCY MEDICINE
Payer: COMMERCIAL

## 2022-01-25 VITALS — HEIGHT: 69 IN | WEIGHT: 253.97 LBS

## 2022-01-25 DIAGNOSIS — R07.89 OTHER CHEST PAIN: ICD-10-CM

## 2022-01-25 DIAGNOSIS — I10 ESSENTIAL (PRIMARY) HYPERTENSION: ICD-10-CM

## 2022-01-25 DIAGNOSIS — R74.8 ABNORMAL LEVELS OF OTHER SERUM ENZYMES: ICD-10-CM

## 2022-01-25 DIAGNOSIS — R79.9 ABNORMAL FINDING OF BLOOD CHEMISTRY, UNSPECIFIED: ICD-10-CM

## 2022-01-25 LAB
ALBUMIN SERPL ELPH-MCNC: 3.6 G/DL — SIGNIFICANT CHANGE UP (ref 3.3–5)
ALP SERPL-CCNC: 59 U/L — SIGNIFICANT CHANGE UP (ref 40–120)
ALT FLD-CCNC: 55 U/L — SIGNIFICANT CHANGE UP (ref 12–78)
ANION GAP SERPL CALC-SCNC: 4 MMOL/L — LOW (ref 5–17)
AST SERPL-CCNC: 32 U/L — SIGNIFICANT CHANGE UP (ref 15–37)
BASOPHILS # BLD AUTO: 0.04 K/UL — SIGNIFICANT CHANGE UP (ref 0–0.2)
BASOPHILS NFR BLD AUTO: 0.7 % — SIGNIFICANT CHANGE UP (ref 0–2)
BILIRUB SERPL-MCNC: 0.3 MG/DL — SIGNIFICANT CHANGE UP (ref 0.2–1.2)
BUN SERPL-MCNC: 20 MG/DL — SIGNIFICANT CHANGE UP (ref 7–23)
CALCIUM SERPL-MCNC: 8.8 MG/DL — SIGNIFICANT CHANGE UP (ref 8.5–10.1)
CHLORIDE SERPL-SCNC: 105 MMOL/L — SIGNIFICANT CHANGE UP (ref 96–108)
CK SERPL-CCNC: 573 U/L — HIGH (ref 26–308)
CO2 SERPL-SCNC: 28 MMOL/L — SIGNIFICANT CHANGE UP (ref 22–31)
CREAT SERPL-MCNC: 1.02 MG/DL — SIGNIFICANT CHANGE UP (ref 0.5–1.3)
D DIMER BLD IA.RAPID-MCNC: <150 NG/ML DDU — SIGNIFICANT CHANGE UP
EOSINOPHIL # BLD AUTO: 0.57 K/UL — HIGH (ref 0–0.5)
EOSINOPHIL NFR BLD AUTO: 10.1 % — HIGH (ref 0–6)
GLUCOSE SERPL-MCNC: 116 MG/DL — HIGH (ref 70–99)
HCT VFR BLD CALC: 40.1 % — SIGNIFICANT CHANGE UP (ref 39–50)
HGB BLD-MCNC: 14 G/DL — SIGNIFICANT CHANGE UP (ref 13–17)
IMM GRANULOCYTES NFR BLD AUTO: 0.2 % — SIGNIFICANT CHANGE UP (ref 0–1.5)
LYMPHOCYTES # BLD AUTO: 2.2 K/UL — SIGNIFICANT CHANGE UP (ref 1–3.3)
LYMPHOCYTES # BLD AUTO: 39.1 % — SIGNIFICANT CHANGE UP (ref 13–44)
MCHC RBC-ENTMCNC: 30.3 PG — SIGNIFICANT CHANGE UP (ref 27–34)
MCHC RBC-ENTMCNC: 34.9 GM/DL — SIGNIFICANT CHANGE UP (ref 32–36)
MCV RBC AUTO: 86.8 FL — SIGNIFICANT CHANGE UP (ref 80–100)
MONOCYTES # BLD AUTO: 0.5 K/UL — SIGNIFICANT CHANGE UP (ref 0–0.9)
MONOCYTES NFR BLD AUTO: 8.9 % — SIGNIFICANT CHANGE UP (ref 2–14)
NEUTROPHILS # BLD AUTO: 2.31 K/UL — SIGNIFICANT CHANGE UP (ref 1.8–7.4)
NEUTROPHILS NFR BLD AUTO: 41 % — LOW (ref 43–77)
PLATELET # BLD AUTO: 206 K/UL — SIGNIFICANT CHANGE UP (ref 150–400)
POTASSIUM SERPL-MCNC: 4.2 MMOL/L — SIGNIFICANT CHANGE UP (ref 3.5–5.3)
POTASSIUM SERPL-SCNC: 4.2 MMOL/L — SIGNIFICANT CHANGE UP (ref 3.5–5.3)
PROT SERPL-MCNC: 7.4 GM/DL — SIGNIFICANT CHANGE UP (ref 6–8.3)
RBC # BLD: 4.62 M/UL — SIGNIFICANT CHANGE UP (ref 4.2–5.8)
RBC # FLD: 12.2 % — SIGNIFICANT CHANGE UP (ref 10.3–14.5)
SODIUM SERPL-SCNC: 137 MMOL/L — SIGNIFICANT CHANGE UP (ref 135–145)
TROPONIN I, HIGH SENSITIVITY RESULT: 6.78 NG/L — SIGNIFICANT CHANGE UP
WBC # BLD: 5.63 K/UL — SIGNIFICANT CHANGE UP (ref 3.8–10.5)
WBC # FLD AUTO: 5.63 K/UL — SIGNIFICANT CHANGE UP (ref 3.8–10.5)

## 2022-01-25 PROCEDURE — 71046 X-RAY EXAM CHEST 2 VIEWS: CPT | Mod: 26

## 2022-01-25 PROCEDURE — 85025 COMPLETE CBC W/AUTO DIFF WBC: CPT

## 2022-01-25 PROCEDURE — 71046 X-RAY EXAM CHEST 2 VIEWS: CPT

## 2022-01-25 PROCEDURE — 93005 ELECTROCARDIOGRAM TRACING: CPT

## 2022-01-25 PROCEDURE — 84484 ASSAY OF TROPONIN QUANT: CPT

## 2022-01-25 PROCEDURE — 99283 EMERGENCY DEPT VISIT LOW MDM: CPT | Mod: 25

## 2022-01-25 PROCEDURE — 93010 ELECTROCARDIOGRAM REPORT: CPT

## 2022-01-25 PROCEDURE — 85379 FIBRIN DEGRADATION QUANT: CPT

## 2022-01-25 PROCEDURE — 36415 COLL VENOUS BLD VENIPUNCTURE: CPT

## 2022-01-25 PROCEDURE — 80053 COMPREHEN METABOLIC PANEL: CPT

## 2022-01-25 PROCEDURE — 99285 EMERGENCY DEPT VISIT HI MDM: CPT

## 2022-01-25 PROCEDURE — 82550 ASSAY OF CK (CPK): CPT

## 2022-01-25 RX ORDER — SODIUM CHLORIDE 9 MG/ML
1000 INJECTION INTRAMUSCULAR; INTRAVENOUS; SUBCUTANEOUS ONCE
Refills: 0 | Status: COMPLETED | OUTPATIENT
Start: 2022-01-25 | End: 2022-01-25

## 2022-01-25 RX ADMIN — SODIUM CHLORIDE 1000 MILLILITER(S): 9 INJECTION INTRAMUSCULAR; INTRAVENOUS; SUBCUTANEOUS at 21:56

## 2022-01-25 NOTE — ED STATDOCS - PROGRESS NOTE DETAILS
Per Pac , [id number], 51 y/0 Male came to ED because his blood pressure was high and he was having some chest pain.   Feels pain when he lays down at night.   Feels cold sensation.  Neg associated nausea, vomiting, dizziness, SOB, diaphoresis.  First Trop not elevated.   , improved from outpt labs.  IVF running.  Pt will be pending 2nd Trop.    Nelsy Shrestha PA-C

## 2022-01-25 NOTE — ED STATDOCS - OBJECTIVE STATEMENT
52 y/o male with a PMHx of HTN and HLD presents to the ED c/o L chest, shoulder back pian discomfort x1 weeks. Pt saw his PMD on 1/19 elevated , repeat lab work today showed persistent CK to 600s alongside HLD. Pt sent to the ED for further eval. Pt states discomfort has improved since a week ago. Pt denies any other symptoms currently. 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

## 2022-01-25 NOTE — ED ADULT NURSE NOTE - NSIMPLEMENTINTERV_GEN_ALL_ED
Implemented All Fall with Harm Risk Interventions:  [place] to call system. Call bell, personal items and telephone within reach. Instruct patient to call for assistance. Room bathroom lighting operational. Non-slip footwear when patient is off stretcher. Physically safe environment: no spills, clutter or unnecessary equipment. Stretcher in lowest position, wheels locked, appropriate side rails in place. Provide visual cue, wrist band, yellow gown, etc. Monitor gait and stability. Monitor for mental status changes and reorient to person, place, and time. Review medications for side effects contributing to fall risk. Reinforce activity limits and safety measures with patient and family. Provide visual clues: red socks.

## 2022-01-25 NOTE — ED STATDOCS - CARE PROVIDER_API CALL
Robin Holland (MD)  Cardiovascular Disease  [address], [address]  [place] [address]  Phone: [phone]  Fax: [phone]  Follow Up Time:

## 2022-01-25 NOTE — ED STATDOCS - CLINICAL SUMMARY MEDICAL DECISION MAKING FREE TEXT BOX
EKG nonischemic.  CK improving on own, given IVF.  Troponin x 2 negative.  Pt feels well on reevaluation.  D/c home with cardiology follow up.

## 2022-01-25 NOTE — ED ADULT NURSE NOTE - NSFALLRSKASSESSDT_ED_ALL_ED
7/3/2017  1617  Follow up call to patient's home, spoke with patient  Pt. Reports doing well, patient reports her base line breathing, does say she gets SOB easily.  Pt. Reports productive cough, clear in sputum.  Pt. Reports ambulating with her rolator, denies any recent falls.  Home safety and fall prevention reviewed.  Pt. Reports night light in her bedroom and states her bathroom is just a few steps from her room.  Pt. Reports night light in bathroom.  Pt. Reports chair in BR and reports son will help her into tub chair, she baths her self and he will assist her out of tub.  Pt. Reports fair appetite, patient reports eating soft foods, having denture issues.  Pt. Reports waiting for lower denture from dentist, states she has had some recent dental work done.  Pt. Reports no issues/concerns during this call.  Noted OPCOLU BRAGG note.   Will follow up next week.      Follow up Plan:    · Empower patient/caregiver to discuss treatment plan with Physician/care team.  · Recognize and provide educational material (LUCILA).  · Complete medication reconciliation.  · Encourage Medication Compliance.  · Encourage compliance with annual vaccinations.  · Collaborate with OPCM YEMI Richardson RN       25-Jan-2022 20:58

## 2022-01-25 NOTE — ED STATDOCS - PATIENT PORTAL LINK FT
You can access the FollowMyHealth Patient Portal offered by [facility] by registering at the following website: [email or web address]. By joining [employer]’s FollowMyHealth portal, you will also be able to view your health information using other applications (apps) compatible with our system.

## 2022-01-26 VITALS
SYSTOLIC BLOOD PRESSURE: 151 MMHG | RESPIRATION RATE: 17 BRPM | DIASTOLIC BLOOD PRESSURE: 89 MMHG | OXYGEN SATURATION: 96 % | TEMPERATURE: 98 F | HEART RATE: 73 BPM

## 2022-01-26 LAB — TROPONIN I, HIGH SENSITIVITY RESULT: 8.74 NG/L — SIGNIFICANT CHANGE UP

## 2022-01-31 ENCOUNTER — TRANSCRIPTION ENCOUNTER (OUTPATIENT)
Age: 52
End: 2022-01-31

## 2022-01-31 ENCOUNTER — APPOINTMENT (OUTPATIENT)
Dept: CARDIOLOGY | Facility: CLINIC | Age: 52
End: 2022-01-31
Payer: COMMERCIAL

## 2022-01-31 VITALS
BODY MASS INDEX: 37.59 KG/M2 | HEART RATE: 71 BPM | SYSTOLIC BLOOD PRESSURE: 142 MMHG | DIASTOLIC BLOOD PRESSURE: 90 MMHG | WEIGHT: 248 LBS | OXYGEN SATURATION: 98 % | HEIGHT: 68 IN

## 2022-01-31 DIAGNOSIS — I10 ESSENTIAL (PRIMARY) HYPERTENSION: ICD-10-CM

## 2022-01-31 DIAGNOSIS — R73.03 PREDIABETES.: ICD-10-CM

## 2022-01-31 DIAGNOSIS — E78.5 HYPERLIPIDEMIA, UNSPECIFIED: ICD-10-CM

## 2022-01-31 PROCEDURE — 99204 OFFICE O/P NEW MOD 45 MIN: CPT

## 2022-01-31 PROCEDURE — 93000 ELECTROCARDIOGRAM COMPLETE: CPT

## 2022-01-31 RX ORDER — FLUCONAZOLE 150 MG/1
150 TABLET ORAL
Qty: 1 | Refills: 0 | Status: DISCONTINUED | COMMUNITY
Start: 2017-06-26 | End: 2022-01-31

## 2022-01-31 RX ORDER — CLOTRIMAZOLE 10 MG/G
1 CREAM TOPICAL TWICE DAILY
Qty: 1 | Refills: 0 | Status: DISCONTINUED | COMMUNITY
Start: 2017-06-26 | End: 2022-01-31

## 2022-01-31 NOTE — DISCUSSION/SUMMARY
[FreeTextEntry1] : Pt is a 52 y/o M with PMH HTN, HLD, preDM, BMI 37\par \par Will check transthoracic echocardiogram to evaluate left ventricular function and assess for any structural abnormalities\par will perform ETT to assess patient's current cardiac reserve to incremental activity and check for provocable ECG changes. \par \par HTN:\par elevated today again\par He has not started norvasc that was prescribed for him - encouraged him to start\par Advised low salt diet, regular exercise, weight loss \par He has appt coming up with his PCP - labs will be repeated then\par \par HLD/preDM:\par Advised lifestyle modifications \par \par Pt will return in 3 mnths or sooner as needed but I encouraged communication via phone or portal if necessary. \par The described plan was discussed with the pt.  All questions and concerns were addressed to the best of my knowledge.

## 2022-01-31 NOTE — HISTORY OF PRESENT ILLNESS
[FreeTextEntry1] : Pt is a 50 y/o F who is referred here today by their PCP for evaluation.  He has PMH HLD, HTN, preDM, BMI 37.  He was recently seen at  for elevated CK.  He was found to have elevated 's/90's.  He states that he is feeling well overall and has no cardiac complaints at this time.  \par He was prescribed norvasc but has not started this yet. \par COVID vaccine\par \par PMH: HTN, HLD, preDM\par PCP Dr Uriel Simental\par Family hx: no family history CAD\par Smoking status: never\par ETOH 12 beers daily\par no drug use\par Current exercise: none \par Daily water intake: >60oz\par Daily caffeine intake: 8 cups coffee\par Previous cardiac testing: none\par

## 2022-02-01 NOTE — ED ADULT NURSE NOTE - NSFALLRSKASSESASSIST_ED_ALL_ED
You met with Pediatric Neurosurgery at the [facility]    MARIE Pedraza Dr., Dr., NP      Pediatric Appointment Scheduling and Call Center:   [phone]    Nurse Practitioner  [phone]    Mailing Address  [address] [address]    Street Address   [address] [place] [id number]    Fax Number  [phone]    For urgent matters that cannot wait until the next business day, occur over a holiday and/or weekend, report directly to your nearest ER or you may call [phone] and ask to page the Pediatric Neurosurgery Resident on call.     no

## 2022-03-23 ENCOUNTER — APPOINTMENT (OUTPATIENT)
Dept: CARDIOLOGY | Facility: CLINIC | Age: 52
End: 2022-03-23

## 2022-03-28 ENCOUNTER — TRANSCRIPTION ENCOUNTER (OUTPATIENT)
Age: 52
End: 2022-03-28

## 2022-04-04 ENCOUNTER — TRANSCRIPTION ENCOUNTER (OUTPATIENT)
Age: 52
End: 2022-04-04

## 2022-04-11 ENCOUNTER — TRANSCRIPTION ENCOUNTER (OUTPATIENT)
Age: 52
End: 2022-04-11

## 2022-05-02 ENCOUNTER — APPOINTMENT (OUTPATIENT)
Dept: CARDIOLOGY | Facility: CLINIC | Age: 52
End: 2022-05-02

## 2024-03-03 ENCOUNTER — NON-APPOINTMENT (OUTPATIENT)
Age: 54
End: 2024-03-03

## 2024-10-28 NOTE — ED ADULT NURSE NOTE - NS ED NOTE ABUSE RESPONSE YN
[facility] - [place] CRITICAL CARE SERVICE     PROGRESS NOTE    Patient: Marly Neal Date: 10/28/2024   female, 57 year old  Admit Date: 2024   Attending: Evangelist Pinto MD Primary Care Physician: Pcp, No     ICU admission Date: 2024   Operation: HM3 DT-LVAD & Tricuspid Valve Repair on 10/14/2024 with Dr Pinto.   Post-operative Day: 12                                                                 SUMMARY OF PLAN    1.   POD#12 s/p HM3 DT-LVAD & Tricuspid Valve Repair on 10/14/2024 with Dr Pinto:   Removal of R axillary impella  Chronic biventricular systolic heart failure d/t longstanding NICMP   Acute on Chronic Cardiogenic Shock historically on chronic Milrinone (since 2024)   S/p ICD (2016), NSVT with ICD shocks 2023  Hypertension  -- Maintain MAP > 65 mmHg, CI > 2.0.  -- LVAD speed at 5100 RPM  -- I last saw Ms. Neal on 10/24, since that time she has been slowly weaned over the past week from  @5 to  at 3@5. Milrinone was also weaned from 0.25-->0.125       -- consider DC swan?  -- Titration of vasoactive and inotropic infusions co-managed with AHF and CV surgery  -- Significant decrease in CVP from 25s to 16 after days of CRRT/SLED, now pt is on a iHD schedule  -- Monitor chest tube, urine output closely.   -- Recommend bryan Horvath for tunneled HD cath placement with IR 10/28    2.   #Acute kidney injury on baseline chronic kidney disease stage 3  #Cardiorenal syndrome with diuretic resistance  -- Dr. Cheney/Rip team following, recs appreciated-- on sched iHD.  Rec DC saravanan today  -- Remains on 40mg torsemide THREE TIMES DAILY; UO of >2L over past 24h and 3L off with SLED       3. IFRAH on home CPAP  -- using hospital device at this time.   -- Using nightly bipap for 5-6 hrs per night; 10/5/40%    4.  Type II Diabetes Mellitus:SSI plus lantus 38u nightly to maintain serum blood glucose < 180.    -----------------------------    Best Practice:  - VTE: SCD, heparin gtt + warfarin   - SUP:  famotidine  - Glycemic control: lantus plus SSI  - Nutrition: Diet- carb consistent; fluid restricted; can add renal restriction when nightly SLED is stopped  - Last BM:1 (10/23/24 0700)  - Therapy/mobilization: PT/OT/ST ordered, ADAT    ACTIVE PROBLEM LIST  See Below    Disposition: admit to CVICU    CODE STATUS: FULL CODE     Brief History of Present Illness:  The patient is a 57 Y F who per [facility] has \"a past medical history significant for chronic biventricular systolic heart failure d/t longstanding NICMP on chronic Milrinone (since 8/2024), s/p ICD (2/2016), NSVT with ICD shocks 2/2023, LV thrombus, Type II Diabetes Mellitus, Hypertension, CKD-2/3, IFRAH, and substance (marijuana / cocaine). Patient had transferred her advanced heart failure care to [facility] earlier this year; underwent DT LVAD eval and was deemed ineligible on 9/6/2024 d/t lack of social support and RV failure. Patient presented to outpatient clinic at [facility] w/ complaints of abdominal distention, shortness of breath, leg edema and weight gain. Transferred to [facility] for second opinion [facility] surgical options eval. \"      Recent Interval Events:   10/28: NAOE.      Subjective: No complaints.     Labs/Imaging: Reviewed.     REVIEW OF SYSTEMS  ROS   Otherwise a full 10 point ROS was completed and negative.     PHYSICAL EXAM  Gen:  Sitting up in chair  Neuro: speech fluent and intact, following command sin ext x4  Neck:  Supple, trachea midline  Chest:  Decreased bibasilar breath sounds,   Heart: Regular rate and rhythm, no murmurs/rubs/gallops appreciated, chest tubes in place with serosanguinous drainage  Abdomen: Soft, nontender, nondistended  Extremities: B/l LE pitting edema 3+  Skin:  Sternal dressing in place, C/D/I      NEURO:   # Analgesia: PRN tylenol, fentanyl PRN. PRN PO norco, oxycodon, tramadol    #Hx substance dependence (marijuana / cocaine) :  -Tox screen negative 9/19/2024; Last + 2/2024    CV:  # s/p HM3 DT-LVAD & Tricuspid Valve Repair  on 10/14/2024 with Dr Pinto:   #Removal of R axillary impella  #Chronic biventricular systolic heart failure d/t longstanding NICMP   #Acute on Chronic Cardiogenic Shock historically on chronic Milrinone (since 8/2024)   #S/p ICD (2/2016), NSVT with ICD shocks 2/2023  #Hypertension  -- Infusions: Dobutamine, milrinone  -- orders for nicardipine, hydralazine IV PRN, sched hydralazine 25mg q8h  -- heart wires remain in place, monitor INR   -- LVAD speed 5100  -- Limit PO fluids to 1500 cc/day  -- Watch Cr, K, serum HCO3, Na.  -- Ct resp support with O2 by NC. Keep o2 sat > 92%. Prn Bipap if needed  -- Sit upright in bed, avoid supine position   -- Off heparin gtt. On warfarin    -- Titration goals: CI>/= 2, MAP 65-80 mmHg. Still oliguric on high doses of diuretics; planning to continue renal replacement therapy  -- Invasive monitoring: Sheridan -lin, CVC, arterial line needed for hemodynamic monitoring  -- Last/intraop echo: Reviewed         Sinus rhythm  96 Pulse  Min: 85  Max: 96   Blood Pressure (!) 0/0 No data recorded   Art BP 80/70 Arterial Line BP  Min: 62/54  Max: 96/79   CVP (!) 25 mmHg CVP (mmHg)  Min: 11 mmHg  Max: 25 mmHg     SvO2 (!) 23 % SVO2  Min: 23 %  Max: 64 %   Cardiac Output 5.4 l/min Cardiac Output (l/min)  Min: 3.8 l/min  Max: 6.6 l/min   Cardiac index 2.6 l/min/m2 Cardiac Index (l/min/m2)  Min: 1.8 l/min/m2  Max: 3.2 l/min/m2       PULM:  # Obstructive sleep apnea  -- cont to encourage nightly bipap/CPAP use  #Post-op respiratory insufficiency:   -Continue volume expansion protol with frequent IS, PT/OT, OOB to chair.         - ABG:   Lab Results   Component Value Date    APH 7.47 (H) 10/27/2024    APCO2 29 (L) 10/27/2024    APO2 84 10/27/2024    AHCO3 21 (L) 10/27/2024    ASAT 97 10/27/2024         FEN/GI:    # Best practices  - Stress ulcer prophylaxis: famotidine  - Replete lytes prn  - Bowel regimen prn    # Nutrition  - Diet: carb consistent, fluid restricted.    :  # acute kidney injury on  CKD 3 with volume overload  - UOP noted, remains oliguric despite high doses of diuretics  - Lei: Needed for HD instability and strict I/O measurement     Weight: 98.4 kg (216 lb 14.9 oz), Admit: Weight: 109.3 kg (240 lb 15.4 oz)  I/O last 3 completed shifts:  In: 1277.4 [P.O.:1000; I.V.:277.4]  Out: 290 [Urine:290]    Intake/Output Summary (Last 24 hours) at 10/28/2024 0810  Last data filed at 10/28/2024 0600  Gross per 24 hour   Intake 1177.41 ml   Output 290 ml   Net 887.41 ml       Recent Labs   Lab 10/28/24  0519 10/27/24  0419 10/26/24  1100 10/26/24  0457 10/25/24  2121 10/25/24  1500 10/25/24  0457   SODIUM 123* 126*  --  123*  --  122* 124*   POTASSIUM 4.9 4.1  --  5.1   < > 5.1 5.0   CHLORIDE 90* 93*  --  90*  --  92* 92*   CO2 20* 23  --  21  --  21 24   BUN 64* 43*  --  48*  --  40* 29*   CREATININE 5.16* 4.05*  --  4.31*  --  3.41* 2.51*   GLUCOSE 162* 110*  --  131*  --  176* 193*   ANIONGAP 18 14  --  17  --  14 13   CHIDI 1.09* 1.06*  --  1.11*  --  1.12* 1.15   PHOS  --   --   --  5.9*  --  4.8* 3.7   MG  --   --   --  3.0*  --  2.8* 2.9*   LACTA  --   --  1.1  --   --   --   --     < > = values in this interval not displayed.        HEME:  # Acute blood loss anemia  - Monitor daily CBC  - Transfuse for Hgb < 7.0 in the absence of active bleeding    # DVT Prophylaxis  - SCDs  - Coumadin    Lab Results   Component Value Date    HGB 8.3 (L) 10/28/2024    HGB 8.1 (L) 10/27/2024    HCT 26.3 (L) 10/28/2024     (L) 10/28/2024    PLT 98 (L) 10/27/2024    PTT 28 10/21/2024    INR 2.1 10/28/2024    INR 2.5 10/27/2024       ID:  #Leukocytosis: suspect 2/2 to stress, cont to monitor closely and deline as able    Temperature: 99 °F (37.2 °C), Temp  Min: 97.9 °F (36.6 °C)  Max: 99 °F (37.2 °C)  Lab Results   Component Value Date    WBC 16.7 (H) 10/28/2024    WBC 17.5 (H) 10/27/2024    WBC 21.9 (H) 10/26/2024    CRP 0.7 01/06/2024     Cultures: Not Applicable    ENDO:  # Glycemic control/DM  - on lantus  30u nightly plus sched lantus 10u THREE TIMES DAILY plus insulin gtt as needed    # Obesity    BMI 38.43     Recent Labs   Lab 10/27/24  1218 10/27/24  2058   GLUCOSE BEDSIDE 154* 147*     Lab Results   Component Value Date    GLUCOSE 162 (H) 10/28/2024    TSH 0.946 10/14/2024       Reviewed Data Points:  Allergies, Medications, Labs, Imaging, Physician and Nursing Notes    ACCS Attestation    Discussed case with CV surgery, AHF, bedside RN, clinical pharmacist.    The patient has been counseled on expected course, management options and plan of care.     This patient is critically ill as documented above. I evaluated the patient and reviewed imaging and laboratory data.    Critical care services I provided [phone]  45 minutes not including time allocated for procedures.    Clay Lopez MD  Spectralink: [phone]  (7am to 7pm)             Yes

## 2024-12-04 NOTE — PROGRESS NOTE ADULT - PROVIDER SPECIALTY LIST ADULT
12/04/24 1122   Readmission Assessment   Number of Days since last admission? 8-30 days   Previous Disposition Home with Home Health   Who is being Interviewed Patient   What was the patient's/caregiver's perception as to why they think they needed to return back to the hospital? Other (Comment)  (left side of my throat/neck started to swell, difficulty breathing and swallowing)   Did you visit your Primary Care Physician after you left the hospital, before you returned this time? Yes   Did you see a specialist, such as Cardiac, Pulmonary, Orthopedic Physician, etc. after you left the hospital? No   Who advised the patient to return to the hospital? Physician   Does the patient report anything that got in the way of taking their medications? No   In our efforts to provide the best possible care to you and others like you, can you think of anything that we could have done to help you after you left the hospital the first time, so that you might not have needed to return so soon? Other (Comment)  (no none)        Cardiology

## 2025-03-31 ENCOUNTER — NON-APPOINTMENT (OUTPATIENT)
Age: 55
End: 2025-03-31